# Patient Record
Sex: FEMALE | Race: WHITE | NOT HISPANIC OR LATINO | URBAN - METROPOLITAN AREA
[De-identification: names, ages, dates, MRNs, and addresses within clinical notes are randomized per-mention and may not be internally consistent; named-entity substitution may affect disease eponyms.]

---

## 2018-12-10 ENCOUNTER — OFFICE VISIT (OUTPATIENT)
Dept: OBSTETRICS AND GYNECOLOGY | Facility: CLINIC | Age: 58
End: 2018-12-10
Payer: COMMERCIAL

## 2018-12-10 VITALS
WEIGHT: 159 LBS | SYSTOLIC BLOOD PRESSURE: 100 MMHG | BODY MASS INDEX: 25.55 KG/M2 | DIASTOLIC BLOOD PRESSURE: 64 MMHG | HEIGHT: 66 IN

## 2018-12-10 DIAGNOSIS — Z01.419 ENCOUNTER FOR ANNUAL ROUTINE GYNECOLOGICAL EXAMINATION: ICD-10-CM

## 2018-12-10 DIAGNOSIS — Z12.39 SCREENING FOR MALIGNANT NEOPLASM OF BREAST: Primary | ICD-10-CM

## 2018-12-10 PROCEDURE — 87624 HPV HI-RISK TYP POOLED RSLT: CPT | Performed by: OBSTETRICS & GYNECOLOGY

## 2018-12-10 PROCEDURE — G0145 SCR C/V CYTO,THINLAYER,RESCR: HCPCS | Performed by: OBSTETRICS & GYNECOLOGY

## 2018-12-10 PROCEDURE — 99386 PREV VISIT NEW AGE 40-64: CPT | Performed by: OBSTETRICS & GYNECOLOGY

## 2018-12-10 ASSESSMENT — ENCOUNTER SYMPTOMS
APPETITE CHANGE: 0
DEPRESSION: 0
NAUSEA: 0
SLEEP DISTURBANCE: 0
CONSTIPATION: 1
FEVER: 0
SEIZURES: 0
COUGH: 0
CHEST TIGHTNESS: 0
CONFUSION: 0
ADENOPATHY: 0
EYE PAIN: 0
BRUISES/BLEEDS EASILY: 0
FATIGUE: 0
CHILLS: 0
MYALGIAS: 0
DIARRHEA: 0
DIZZINESS: 0
VOMITING: 0
ABDOMINAL PAIN: 0
NUMBNESS: 0
WEAKNESS: 0
BREAST PAIN: 0
TROUBLE SWALLOWING: 0
BREAST MASS: 0
DIFFICULTY URINATING: 0
SORE THROAT: 0
VOICE CHANGE: 0
BLOOD IN STOOL: 0
NERVOUS/ANXIOUS: 0
AGITATION: 0
SHORTNESS OF BREATH: 0
UNEXPECTED WEIGHT CHANGE: 0
PALPITATIONS: 0

## 2018-12-10 NOTE — PROGRESS NOTES
"12/10/2018  Elizabeth Pleitez is a 58 y.o.  female who presents for annual exam. Concerns today include none.  Has not seen physician in several years (decades).  Needs to reestablish care.   passed away 6 years ago due to \"internal bleeding.\"  Not currently sexually active.      The patient is not currently sexually active. Partners: male    Current contraception: none    Periods are postmenopausal 41yo.     Menopausal symptoms: present and include vaginal driness.    GYN screening history: last pap: was normal. In 40s  History of abnormal Pap smear: yes - s/p cone.  normal after  History of abnormal mammogram: no, but last in her mid 40s  Family history of uterine or ovarian cancer: no  Family history of breast cancer: no  Regular self breast exam: no    Menstrual History:  OB History      Para Term  AB Living    0 0 0 0 0 0    SAB TAB Ectopic Multiple Live Births    0 0 0 0 0         No LMP recorded. Patient is postmenopausal.  Menopause Status: Post-Menopause  Age at menopause: 38  Age at Menarche: 14      Review of Systems and Physical Exam  The following have been reviewed and updated as appropriate in this visit: Problems       /64 (BP Location: Left upper arm, Patient Position: Sitting)   Ht 1.664 m (5' 5.5\")   Wt 72.1 kg (159 lb)   BMI 26.06 kg/m²   Review of Systems   Constitutional: Negative for appetite change, chills, fatigue, fever and unexpected weight change.   HENT: Negative for congestion, hearing loss, mouth sores, sore throat, trouble swallowing and voice change.    Eyes: Negative for pain.   Respiratory: Negative for cough, chest tightness and shortness of breath.    Cardiovascular: Negative for chest pain and palpitations.   Gastrointestinal: Positive for constipation. Negative for abdominal pain, blood in stool, diarrhea, nausea and vomiting.   Endocrine: Negative for cold intolerance and heat intolerance.   Genitourinary: Positive for menopause. Negative for " difficulty urinating, dyspareunia, genital sores, pelvic pain, urgency, vaginal bleeding, vaginal discharge, vaginal itching and vaginal pain.   Breast: Negative for breast discharge, breast mass and breast pain.   Musculoskeletal: Negative for gait problem and myalgias.   Allergic/Immunologic: Negative for immunocompromised state.   Neurological: Negative for dizziness, seizures, weakness and numbness.   Hematological: Negative for adenopathy. Does not bruise/bleed easily.   Psychiatric/Behavioral: Negative for agitation, behavioral problems, confusion, depression and sleep disturbance. The patient is not nervous/anxious.      Physical Exam   Constitutional: She is oriented to person, place, and time. She appears well-developed and well-nourished.   Genitourinary: Uterus normal. Pelvic exam was performed with patient in supine position.   No labial rash.   External female genitalia normal. No signs of erythema or atrophy.   Urethral meatus normal. There is no urethral meatus prolapse, lesion, tenderness or bleeding. There is no urethral discharge, erythema, tenderness or mass. The urethra is not everted.   Normal bladder. No bladder tenderness. Vagina exhibits no lesion. There is no vaginal atrophy. No erythema, tenderness or bleeding in the vagina. No vaginal discharge found. Perineum intact.   Cervix exam normal.Cervix does not exhibit motion tenderness, discharge, friability or polyp.   Uterus is normal. Uterus is mobile. Uterus is not tender. Uterine contour is regular. Uterus is retroflexed. Right adnexum does not display mass, does not display tenderness and does not display fullness. Left adnexum does not display mass, does not display tenderness and does not display fullness.   Rectal exam shows normal sphincter tone, no rectal prolapse and no external hemorrhoid.   HENT:   Head: Normocephalic and atraumatic.   Eyes: EOM are normal. Pupils are equal, round, and reactive to light.   Neck: Normal range of  motion. Neck supple.   Cardiovascular: Normal rate and regular rhythm.    Pulmonary/Chest: Effort normal and breath sounds normal. Right breast exhibits no mass, no nipple discharge, no skin change and no tenderness. Left breast exhibits no mass, no nipple discharge, no skin change and no tenderness.   Abdominal: Soft. She exhibits no mass. There is no tenderness. There is no rebound and no guarding.   Neurological: She is alert and oriented to person, place, and time.   Skin: Skin is warm.   Psychiatric: She has a normal mood and affect. Her behavior is normal. Thought content normal.       Assessment/Plan:    Routine annual Gyn exam. Normal exam today. Pap with HPV done today. CBE normal, SBE taught. Mammogram ordered. Colonoscopy recommended.  Has PCP appt tomorrow.  Will schedule.  No problem-specific Assessment & Plan notes found for this encounter.     Diet, exercise, healthy lifestyle discussed. All questions answered.  Problem List Items Addressed This Visit     None      Visit Diagnoses     Screening for malignant neoplasm of breast    -  Primary    Relevant Orders    BI SCREENING MAMMOGRAM BILATERAL    Encounter for annual routine gynecological examination        Relevant Orders    Cytology, Thinprep Pap    THINPREP PAP ([facility])        New Medications Ordered This Visit   Medications   • ascorbate calcium (VITAMIN C ORAL)     Sig: Take by mouth.   • vitamin E acetate (VITAMIN E ORAL)     Sig: Take by mouth.   • MULTIVITAMIN ORAL     Sig: Take by mouth.     No Follow-up on file.  Karen Dickson, DO

## 2018-12-11 ENCOUNTER — OFFICE VISIT (OUTPATIENT)
Dept: PRIMARY CARE | Facility: CLINIC | Age: 58
End: 2018-12-11
Payer: COMMERCIAL

## 2018-12-11 VITALS
HEIGHT: 66 IN | OXYGEN SATURATION: 99 % | DIASTOLIC BLOOD PRESSURE: 70 MMHG | WEIGHT: 158.2 LBS | RESPIRATION RATE: 12 BRPM | HEART RATE: 83 BPM | BODY MASS INDEX: 25.43 KG/M2 | SYSTOLIC BLOOD PRESSURE: 120 MMHG

## 2018-12-11 DIAGNOSIS — Z00.00 ENCOUNTER FOR GENERAL ADULT MEDICAL EXAMINATION WITHOUT ABNORMAL FINDINGS: ICD-10-CM

## 2018-12-11 DIAGNOSIS — Z11.59 NEED FOR HEPATITIS C SCREENING TEST: ICD-10-CM

## 2018-12-11 DIAGNOSIS — Z12.11 SCREEN FOR COLON CANCER: Primary | ICD-10-CM

## 2018-12-11 DIAGNOSIS — C44.311 BASAL CELL CARCINOMA (BCC) OF SKIN OF NOSE: ICD-10-CM

## 2018-12-11 PROBLEM — C44.91 CARCINOMA, BASAL CELL, SKIN: Status: ACTIVE | Noted: 2018-12-11

## 2018-12-11 PROCEDURE — 99386 PREV VISIT NEW AGE 40-64: CPT | Performed by: INTERNAL MEDICINE

## 2018-12-11 RX ORDER — DIMETHICONE 13 MG/ML
LOTION TOPICAL DAILY
COMMUNITY

## 2018-12-11 RX ORDER — TETANUS TOXOID, REDUCED DIPHTHERIA TOXOID AND ACELLULAR PERTUSSIS VACCINE, ADSORBED 5; 2.5; 8; 8; 2.5 [IU]/.5ML; [IU]/.5ML; UG/.5ML; UG/.5ML; UG/.5ML
SUSPENSION INTRAMUSCULAR
Refills: 0 | COMMUNITY
Start: 2018-12-09 | End: 2019-10-05 | Stop reason: ALTCHOICE

## 2018-12-11 ASSESSMENT — ENCOUNTER SYMPTOMS
SHORTNESS OF BREATH: 0
NERVOUS/ANXIOUS: 0
COUGH: 0
MYALGIAS: 0
NUMBNESS: 0
CHILLS: 0
HEMATURIA: 0
CONSTIPATION: 0
EYE DISCHARGE: 0
FEVER: 0
WHEEZING: 0
CHEST TIGHTNESS: 0
PALPITATIONS: 0
ABDOMINAL PAIN: 0
FREQUENCY: 0
BLOOD IN STOOL: 0
LIGHT-HEADEDNESS: 0
VOMITING: 0
WEAKNESS: 0
DYSURIA: 0
ADENOPATHY: 0
ARTHRALGIAS: 0
SORE THROAT: 0
FATIGUE: 0
HEADACHES: 0
EYE PAIN: 0
SLEEP DISTURBANCE: 0
BRUISES/BLEEDS EASILY: 0
DIARRHEA: 0
SINUS PRESSURE: 0
RHINORRHEA: 0
ACTIVITY CHANGE: 0
DIZZINESS: 0

## 2018-12-11 NOTE — PATIENT INSTRUCTIONS
You are due for a colonoscopy and mammogram.    I recommend that you get the new shingles vaccine.      You should be fasting for 10 hours before having your labs drawn. Call about a week after the labs are done if you haven't heard from me with the results.
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
## 2018-12-20 LAB
CASE RPRT: NORMAL
CLINICAL INFO: NORMAL
CLINICAL INFO: NORMAL
HPV HR 12 DNA CVX QL NAA+PROBE: NEGATIVE
HPV16 DNA SPEC QL NAA+PROBE: NEGATIVE
HPV18 DNA SPEC QL NAA+PROBE: NEGATIVE
LMP START DATE: NORMAL
SPECIMEN PROCESSING COMMENT: NORMAL
THIN PREP CVX: NORMAL

## 2018-12-23 LAB
ALBUMIN SERPL-MCNC: 4.4 G/DL (ref 3.6–4.8)
ALBUMIN/GLOB SERPL: 1.9 {RATIO} (ref 1.2–2.2)
ALP SERPL-CCNC: 72 IU/L (ref 39–117)
ALT SERPL-CCNC: 17 IU/L (ref 0–32)
AST SERPL-CCNC: 18 IU/L (ref 0–40)
BASOPHILS # BLD AUTO: 0 X10E3/UL (ref 0–0.2)
BASOPHILS NFR BLD AUTO: 0 %
BILIRUB SERPL-MCNC: 0.3 MG/DL (ref 0–1.2)
BUN SERPL-MCNC: 13 MG/DL (ref 8–27)
BUN/CREAT SERPL: 16 (ref 12–28)
CALCIUM SERPL-MCNC: 10 MG/DL (ref 8.7–10.3)
CHLORIDE SERPL-SCNC: 102 MMOL/L (ref 96–106)
CHOLEST SERPL-MCNC: 203 MG/DL (ref 100–199)
CO2 SERPL-SCNC: 28 MMOL/L (ref 20–29)
CREAT SERPL-MCNC: 0.81 MG/DL (ref 0.57–1)
EOSINOPHIL # BLD AUTO: 0.1 X10E3/UL (ref 0–0.4)
EOSINOPHIL NFR BLD AUTO: 1 %
ERYTHROCYTE [DISTWIDTH] IN BLOOD BY AUTOMATED COUNT: 14.2 % (ref 12.3–15.4)
GLOBULIN SER CALC-MCNC: 2.3 G/DL (ref 1.5–4.5)
GLUCOSE SERPL-MCNC: 82 MG/DL (ref 65–99)
HCT VFR BLD AUTO: 41.8 % (ref 34–46.6)
HCV AB S/CO SERPL IA: <0.1 S/CO RATIO (ref 0–0.9)
HDLC SERPL-MCNC: 60 MG/DL
HGB BLD-MCNC: 13.6 G/DL (ref 11.1–15.9)
IMM GRANULOCYTES # BLD: 0 X10E3/UL (ref 0–0.1)
IMM GRANULOCYTES NFR BLD: 0 %
LAB CORP EGFR IF AFRICN AM: 91 ML/MIN/1.73
LAB CORP EGFR IF NONAFRICN AM: 79 ML/MIN/1.73
LDLC SERPL CALC-MCNC: 126 MG/DL (ref 0–99)
LYMPHOCYTES # BLD AUTO: 1.9 X10E3/UL (ref 0.7–3.1)
LYMPHOCYTES NFR BLD AUTO: 31 %
MCH RBC QN AUTO: 29.7 PG (ref 26.6–33)
MCHC RBC AUTO-ENTMCNC: 32.5 G/DL (ref 31.5–35.7)
MCV RBC AUTO: 91 FL (ref 79–97)
MONOCYTES # BLD AUTO: 0.5 X10E3/UL (ref 0.1–0.9)
MONOCYTES NFR BLD AUTO: 8 %
NEUTROPHILS # BLD AUTO: 3.7 X10E3/UL (ref 1.4–7)
NEUTROPHILS NFR BLD AUTO: 60 %
PLATELET # BLD AUTO: 263 X10E3/UL (ref 150–379)
POTASSIUM SERPL-SCNC: 4.5 MMOL/L (ref 3.5–5.2)
PROT SERPL-MCNC: 6.7 G/DL (ref 6–8.5)
RBC # BLD AUTO: 4.58 X10E6/UL (ref 3.77–5.28)
SODIUM SERPL-SCNC: 144 MMOL/L (ref 134–144)
TRIGL SERPL-MCNC: 85 MG/DL (ref 0–149)
TSH SERPL DL<=0.005 MIU/L-ACNC: 3.53 UIU/ML (ref 0.45–4.5)
VLDLC SERPL CALC-MCNC: 17 MG/DL (ref 5–40)
WBC # BLD AUTO: 6.2 X10E3/UL (ref 3.4–10.8)

## 2018-12-24 ENCOUNTER — TELEPHONE (OUTPATIENT)
Dept: PRIMARY CARE | Facility: CLINIC | Age: 58
End: 2018-12-24

## 2018-12-31 ENCOUNTER — DOCUMENTATION (OUTPATIENT)
Dept: PRIMARY CARE | Facility: CLINIC | Age: 58
End: 2018-12-31

## 2019-01-26 ENCOUNTER — HOSPITAL ENCOUNTER (OUTPATIENT)
Dept: RADIOLOGY | Facility: HOSPITAL | Age: 59
Discharge: HOME | End: 2019-01-26
Attending: OBSTETRICS & GYNECOLOGY
Payer: COMMERCIAL

## 2019-01-26 DIAGNOSIS — Z12.39 SCREENING FOR MALIGNANT NEOPLASM OF BREAST: ICD-10-CM

## 2019-01-26 PROCEDURE — 77063 BREAST TOMOSYNTHESIS BI: CPT

## 2019-08-23 ENCOUNTER — HOSPITAL ENCOUNTER (OUTPATIENT)
Facility: CLINIC | Age: 59
End: 2019-08-23
Payer: COMMERCIAL

## 2019-09-28 ENCOUNTER — HOSPITAL ENCOUNTER (EMERGENCY)
Facility: HOSPITAL | Age: 59
Discharge: HOME | End: 2019-09-28
Attending: EMERGENCY MEDICINE
Payer: COMMERCIAL

## 2019-09-28 ENCOUNTER — APPOINTMENT (EMERGENCY)
Dept: RADIOLOGY | Facility: HOSPITAL | Age: 59
End: 2019-09-28
Attending: EMERGENCY MEDICINE
Payer: COMMERCIAL

## 2019-09-28 ENCOUNTER — APPOINTMENT (EMERGENCY)
Dept: RADIOLOGY | Facility: HOSPITAL | Age: 59
End: 2019-09-28
Attending: SURGERY
Payer: COMMERCIAL

## 2019-09-28 VITALS
TEMPERATURE: 99.6 F | DIASTOLIC BLOOD PRESSURE: 56 MMHG | SYSTOLIC BLOOD PRESSURE: 116 MMHG | OXYGEN SATURATION: 99 % | HEART RATE: 64 BPM | RESPIRATION RATE: 16 BRPM

## 2019-09-28 DIAGNOSIS — S82.892A CLOSED FRACTURE OF LEFT ANKLE, INITIAL ENCOUNTER: Primary | ICD-10-CM

## 2019-09-28 LAB
ABO + RH BLD: NORMAL
ALBUMIN SERPL-MCNC: 3.8 G/DL (ref 3.4–5)
ALP SERPL-CCNC: 72 IU/L (ref 35–126)
ALT SERPL-CCNC: 20 IU/L
AMPHET UR QL SCN: NORMAL
ANION GAP SERPL CALC-SCNC: 8 MEQ/L (ref 3–15)
APTT PPP: 31 SEC (ref 23–35)
AST SERPL-CCNC: 22 IU/L (ref 15–41)
BARBITURATES UR QL SCN: NORMAL
BASOPHILS # BLD: 0.03 K/UL
BASOPHILS NFR BLD: 0.5 %
BENZODIAZ UR QL SCN: NORMAL
BILIRUB SERPL-MCNC: 0.5 MG/DL (ref 0.3–1.2)
BILIRUB UR QL STRIP.AUTO: NEGATIVE MG/DL
BLD GP AB SCN SERPL QL: NEGATIVE
BUN SERPL-MCNC: 13 MG/DL (ref 8–20)
CALCIUM SERPL-MCNC: 9.4 MG/DL (ref 8.9–10.3)
CANNABINOIDS UR QL SCN: NORMAL
CHLORIDE SERPL-SCNC: 103 MEQ/L (ref 98–109)
CLARITY UR REFRACT.AUTO: CLEAR
CO2 SERPL-SCNC: 28 MEQ/L (ref 22–32)
COCAINE UR QL SCN: NORMAL
COLOR UR AUTO: YELLOW
CREAT SERPL-MCNC: 0.9 MG/DL
D AG BLD QL: POSITIVE
DIFFERENTIAL METHOD BLD: NORMAL
EOSINOPHIL # BLD: 0.05 K/UL
EOSINOPHIL NFR BLD: 0.8 %
ERYTHROCYTE [DISTWIDTH] IN BLOOD BY AUTOMATED COUNT: 13.5 %
ETHANOL SERPL-MCNC: <5 MG/DL
GFR SERPL CREATININE-BSD FRML MDRD: ABNORMAL ML/MIN/1.73M*2
GLUCOSE SERPL-MCNC: 113 MG/DL (ref 70–99)
GLUCOSE UR STRIP.AUTO-MCNC: NEGATIVE MG/DL
HCT VFR BLDCO AUTO: 43 %
HGB BLD-MCNC: 13.6 G/DL
HGB UR QL STRIP.AUTO: NEGATIVE
IMM GRANULOCYTES # BLD AUTO: 0.02 K/UL
IMM GRANULOCYTES NFR BLD AUTO: 0.3 %
INR PPP: 1 INR
KETONES UR STRIP.AUTO-MCNC: NEGATIVE MG/DL
LABORATORY COMMENT REPORT: NORMAL
LACTATE SERPL-SCNC: 1.6 MMOL/L (ref 0.4–2)
LEUKOCYTE ESTERASE UR QL STRIP.AUTO: NEGATIVE
LYMPHOCYTES # BLD: 1.34 K/UL
LYMPHOCYTES NFR BLD: 22.7 %
MCH RBC QN AUTO: 29.9 PG
MCHC RBC AUTO-ENTMCNC: 31.6 G/DL
MCV RBC AUTO: 94.5 FL
MONOCYTES # BLD: 0.5 K/UL
MONOCYTES NFR BLD: 8.5 %
NEUTROPHILS # BLD: 3.97 K/UL
NEUTS SEG NFR BLD: 67.2 %
NITRITE UR QL STRIP.AUTO: NEGATIVE
NRBC BLD-RTO: 0 %
OPIATES UR QL SCN: NORMAL
PCP UR QL SCN: NORMAL
PDW BLD AUTO: 10.7 FL
PH UR STRIP.AUTO: 7.5 [PH]
PLATELET # BLD AUTO: 234 K/UL
POTASSIUM SERPL-SCNC: 4.2 MEQ/L (ref 3.6–5.1)
PROT SERPL-MCNC: 6.5 G/DL (ref 6–8.2)
PROT UR QL STRIP.AUTO: NEGATIVE
PROTHROMBIN TIME: 12.6 SEC (ref 12.2–14.5)
RBC # BLD AUTO: 4.55 M/UL
SODIUM SERPL-SCNC: 139 MEQ/L (ref 136–144)
SP GR UR REFRACT.AUTO: 1.01
UROBILINOGEN UR STRIP-ACNC: 0.2 EU/DL
WBC # BLD AUTO: 5.91 K/UL

## 2019-09-28 PROCEDURE — 2W3RX1Z IMMOBILIZATION OF LEFT LOWER LEG USING SPLINT: ICD-10-PCS | Performed by: ORTHOPAEDIC SURGERY

## 2019-09-28 PROCEDURE — 73600 X-RAY EXAM OF ANKLE: CPT | Mod: 59,LT

## 2019-09-28 PROCEDURE — 99283 EMERGENCY DEPT VISIT LOW MDM: CPT | Mod: 25

## 2019-09-28 PROCEDURE — 90715 TDAP VACCINE 7 YRS/> IM: CPT

## 2019-09-28 PROCEDURE — 85610 PROTHROMBIN TIME: CPT | Performed by: SURGERY

## 2019-09-28 PROCEDURE — G0480 DRUG TEST DEF 1-7 CLASSES: HCPCS | Performed by: SURGERY

## 2019-09-28 PROCEDURE — 73620 X-RAY EXAM OF FOOT: CPT | Mod: LT

## 2019-09-28 PROCEDURE — 36415 COLL VENOUS BLD VENIPUNCTURE: CPT | Performed by: SURGERY

## 2019-09-28 PROCEDURE — 63600000 HC DRUGS/DETAIL CODE

## 2019-09-28 PROCEDURE — 86900 BLOOD TYPING SEROLOGIC ABO: CPT

## 2019-09-28 PROCEDURE — 80307 DRUG TEST PRSMV CHEM ANLYZR: CPT | Performed by: SURGERY

## 2019-09-28 PROCEDURE — 80053 COMPREHEN METABOLIC PANEL: CPT | Performed by: SURGERY

## 2019-09-28 PROCEDURE — 90471 IMMUNIZATION ADMIN: CPT

## 2019-09-28 PROCEDURE — 3E0234Z INTRODUCTION OF SERUM, TOXOID AND VACCINE INTO MUSCLE, PERCUTANEOUS APPROACH: ICD-10-PCS | Performed by: SURGERY

## 2019-09-28 PROCEDURE — 81003 URINALYSIS AUTO W/O SCOPE: CPT | Performed by: SURGERY

## 2019-09-28 PROCEDURE — 73590 X-RAY EXAM OF LOWER LEG: CPT | Mod: LT

## 2019-09-28 PROCEDURE — 83605 ASSAY OF LACTIC ACID: CPT | Performed by: SURGERY

## 2019-09-28 PROCEDURE — 73610 X-RAY EXAM OF ANKLE: CPT | Mod: LT

## 2019-09-28 PROCEDURE — 85730 THROMBOPLASTIN TIME PARTIAL: CPT | Performed by: SURGERY

## 2019-09-28 PROCEDURE — 85025 COMPLETE CBC W/AUTO DIFF WBC: CPT | Performed by: SURGERY

## 2019-09-28 PROCEDURE — 29515 APPLICATION SHORT LEG SPLINT: CPT | Mod: LT

## 2019-09-28 RX ORDER — LIDOCAINE HYDROCHLORIDE 10 MG/ML
INJECTION, SOLUTION EPIDURAL; INFILTRATION; INTRACAUDAL; PERINEURAL
Status: DISCONTINUED
Start: 2019-09-28 | End: 2019-09-28 | Stop reason: HOSPADM

## 2019-09-28 RX ORDER — DIPHENHYDRAMINE HCL 25 MG
25 CAPSULE ORAL EVERY 6 HOURS PRN
COMMUNITY
End: 2023-03-20

## 2019-09-28 RX ORDER — VIT C/E/ZN/COPPR/LUTEIN/ZEAXAN 250MG-90MG
5000 CAPSULE ORAL DAILY
COMMUNITY

## 2019-09-28 RX ORDER — ACETAMINOPHEN AND CODEINE PHOSPHATE 300; 30 MG/1; MG/1
1-2 TABLET ORAL EVERY 6 HOURS PRN
Qty: 12 TABLET | Refills: 0 | Status: SHIPPED | OUTPATIENT
Start: 2019-09-28 | End: 2019-10-08

## 2019-09-28 RX ADMIN — TETANUS TOXOID, REDUCED DIPHTHERIA TOXOID AND ACELLULAR PERTUSSIS VACCINE, ADSORBED 0.5 ML: 5; 2.5; 8; 8; 2.5 SUSPENSION INTRAMUSCULAR at 12:40

## 2019-09-28 ASSESSMENT — ENCOUNTER SYMPTOMS
CHEST TIGHTNESS: 0
WEAKNESS: 0
PALPITATIONS: 0
HEADACHES: 0
SHORTNESS OF BREATH: 0
LOSS OF CONSCIOUSNESS: 0
BACK PAIN: 0
NUMBNESS: 1
NECK PAIN: 0
EYE PAIN: 0
FACIAL SWELLING: 0
AGITATION: 0
ABDOMINAL PAIN: 0

## 2019-09-28 NOTE — DISCHARGE INSTRUCTIONS
RETURN IMMEDIATELY FOR ANY NEW OR WORSE SYMPTOMS;    REVIEW ANY LABS AND FINAL IMAGING RESULTS (SOMETIMES THESE MAY NOT BE BACK UNTIL TOMORROW)  WITH YOUR DOCTORS AT YOUR NEXT APPOINTMENT.    ** NO WEIGHT BEARING UNTIL CLEARED BY THE ORTHOPEDIST
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
## 2019-09-28 NOTE — PROGRESS NOTES
Intern visited pt. And mother in room #ED01. Pt. Was walked out of room by nurse to bathroom. Spoke to mother who was stressed also because of her recent procedure. Educated mother on what  Department can offer and provided our leaflet. She thanked for visit. When I was to document, noticed that pt was d/c ed.     Beau Hawkins  Intern  x2020, Beeper #[id number]
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
## 2019-09-30 ENCOUNTER — TELEPHONE (OUTPATIENT)
Dept: PRIMARY CARE | Facility: CLINIC | Age: 59
End: 2019-09-30

## 2019-09-30 NOTE — TELEPHONE ENCOUNTER
She is a former [facility] patient. She has 2 charts which I just submitted a merge request. Patient needs to make an appt with orthopedics. Once it has been decided if she needs surgery she will call back to make an appt.

## 2019-10-01 ENCOUNTER — TELEPHONE (OUTPATIENT)
Dept: PRIMARY CARE | Facility: CLINIC | Age: 59
End: 2019-10-01

## 2019-10-01 NOTE — TELEPHONE ENCOUNTER
Pt notified she will need to make an appointment to [email or web address]] care with Dr. Choi

## 2019-10-05 ENCOUNTER — APPOINTMENT (EMERGENCY)
Dept: RADIOLOGY | Facility: HOSPITAL | Age: 59
End: 2019-10-05
Payer: COMMERCIAL

## 2019-10-05 ENCOUNTER — APPOINTMENT (EMERGENCY)
Dept: RADIOLOGY | Facility: HOSPITAL | Age: 59
End: 2019-10-05
Attending: EMERGENCY MEDICINE
Payer: COMMERCIAL

## 2019-10-05 ENCOUNTER — HOSPITAL ENCOUNTER (EMERGENCY)
Facility: HOSPITAL | Age: 59
Discharge: HOME | End: 2019-10-05
Attending: EMERGENCY MEDICINE
Payer: COMMERCIAL

## 2019-10-05 VITALS
DIASTOLIC BLOOD PRESSURE: 53 MMHG | SYSTOLIC BLOOD PRESSURE: 107 MMHG | WEIGHT: 150 LBS | HEIGHT: 65 IN | HEART RATE: 65 BPM | RESPIRATION RATE: 16 BRPM | TEMPERATURE: 98.6 F | BODY MASS INDEX: 24.99 KG/M2 | OXYGEN SATURATION: 99 %

## 2019-10-05 DIAGNOSIS — R07.89 CHEST WALL PAIN: Primary | ICD-10-CM

## 2019-10-05 LAB
ANION GAP SERPL CALC-SCNC: 7 MEQ/L (ref 3–15)
BASOPHILS # BLD: 0.04 K/UL (ref 0.01–0.1)
BASOPHILS NFR BLD: 0.6 %
BUN SERPL-MCNC: 10 MG/DL (ref 8–20)
CALCIUM SERPL-MCNC: 9.2 MG/DL (ref 8.9–10.3)
CHLORIDE SERPL-SCNC: 107 MEQ/L (ref 98–109)
CO2 SERPL-SCNC: 28 MEQ/L (ref 22–32)
CREAT SERPL-MCNC: 0.7 MG/DL
D DIMER PPP IA.FEU-MCNC: 2.84 UG/ML FEU (ref 0–0.5)
DIFFERENTIAL METHOD BLD: NORMAL
EOSINOPHIL # BLD: 0.1 K/UL (ref 0.04–0.36)
EOSINOPHIL NFR BLD: 1.6 %
ERYTHROCYTE [DISTWIDTH] IN BLOOD BY AUTOMATED COUNT: 13.2 % (ref 11.7–14.4)
GFR SERPL CREATININE-BSD FRML MDRD: >60 ML/MIN/1.73M*2
GLUCOSE SERPL-MCNC: 108 MG/DL (ref 70–99)
HCT VFR BLDCO AUTO: 38.3 %
HGB BLD-MCNC: 12.1 G/DL
IMM GRANULOCYTES # BLD AUTO: 0.04 K/UL (ref 0–0.08)
IMM GRANULOCYTES NFR BLD AUTO: 0.6 %
LYMPHOCYTES # BLD: 1.56 K/UL (ref 1.2–3.5)
LYMPHOCYTES NFR BLD: 25.1 %
MCH RBC QN AUTO: 29.7 PG (ref 28–33.2)
MCHC RBC AUTO-ENTMCNC: 31.6 G/DL (ref 32.2–35.5)
MCV RBC AUTO: 94.1 FL (ref 83–98)
MONOCYTES # BLD: 0.52 K/UL (ref 0.28–0.8)
MONOCYTES NFR BLD: 8.4 %
NEUTROPHILS # BLD: 3.96 K/UL (ref 1.7–7)
NEUTS SEG NFR BLD: 63.7 %
NRBC BLD-RTO: 0 %
PDW BLD AUTO: 10.1 FL (ref 9.4–12.3)
PLATELET # BLD AUTO: 253 K/UL
POTASSIUM SERPL-SCNC: 3.9 MEQ/L (ref 3.6–5.1)
RBC # BLD AUTO: 4.07 M/UL (ref 3.93–5.22)
SODIUM SERPL-SCNC: 142 MEQ/L (ref 136–144)
TROPONIN I SERPL-MCNC: <0.03 NG/ML
WBC # BLD AUTO: 6.22 K/UL

## 2019-10-05 PROCEDURE — 85379 FIBRIN DEGRADATION QUANT: CPT | Performed by: EMERGENCY MEDICINE

## 2019-10-05 PROCEDURE — 71260 CT THORAX DX C+: CPT

## 2019-10-05 PROCEDURE — 85025 COMPLETE CBC W/AUTO DIFF WBC: CPT | Performed by: EMERGENCY MEDICINE

## 2019-10-05 PROCEDURE — 80048 BASIC METABOLIC PNL TOTAL CA: CPT | Performed by: EMERGENCY MEDICINE

## 2019-10-05 PROCEDURE — 71046 X-RAY EXAM CHEST 2 VIEWS: CPT

## 2019-10-05 PROCEDURE — 36415 COLL VENOUS BLD VENIPUNCTURE: CPT | Performed by: EMERGENCY MEDICINE

## 2019-10-05 PROCEDURE — 93005 ELECTROCARDIOGRAM TRACING: CPT

## 2019-10-05 PROCEDURE — 84484 ASSAY OF TROPONIN QUANT: CPT | Performed by: EMERGENCY MEDICINE

## 2019-10-05 PROCEDURE — 93005 ELECTROCARDIOGRAM TRACING: CPT | Performed by: EMERGENCY MEDICINE

## 2019-10-05 PROCEDURE — 99284 EMERGENCY DEPT VISIT MOD MDM: CPT | Mod: 25

## 2019-10-05 PROCEDURE — 63600105 HC IODINE BASED CONTRAST: Performed by: EMERGENCY MEDICINE

## 2019-10-05 RX ADMIN — IOHEXOL 80 ML: 350 INJECTION, SOLUTION INTRAVENOUS at 05:57

## 2019-10-05 ASSESSMENT — ENCOUNTER SYMPTOMS
DIFFICULTY URINATING: 0
SHORTNESS OF BREATH: 1
COUGH: 0
VOMITING: 0
FEVER: 0
ABDOMINAL PAIN: 0
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
## 2019-10-06 LAB
ATRIAL RATE: 71
P AXIS: 81
PR INTERVAL: 138
QRS DURATION: 86
QT INTERVAL: 414
QTC CALCULATION(BAZETT): 449
R AXIS: 17
T WAVE AXIS: 56
VENTRICULAR RATE: 71

## 2019-10-06 PROCEDURE — 93010 ELECTROCARDIOGRAM REPORT: CPT | Performed by: INTERNAL MEDICINE

## 2019-10-07 ENCOUNTER — TELEPHONE (OUTPATIENT)
Dept: PRIMARY CARE | Facility: CLINIC | Age: 59
End: 2019-10-07

## 2019-10-07 NOTE — TELEPHONE ENCOUNTER
Spoke to patient she follow up with ortho. Once that is completed she will call office and set up an appt to establish care with a new PCP

## 2019-10-31 ENCOUNTER — TRANSCRIBE ORDERS (OUTPATIENT)
Dept: SCHEDULING | Age: 59
End: 2019-10-31

## 2019-10-31 DIAGNOSIS — S82.842D DISPLACED BIMALLEOLAR FRACTURE OF LEFT LOWER LEG, SUBSEQUENT ENCOUNTER FOR CLOSED FRACTURE WITH ROUTINE HEALING: Primary | ICD-10-CM

## 2019-11-04 ENCOUNTER — OFFICE VISIT (OUTPATIENT)
Dept: WOUND CARE | Facility: HOSPITAL | Age: 59
End: 2019-11-04
Attending: SURGERY
Payer: COMMERCIAL

## 2019-11-04 VITALS — HEART RATE: 80 BPM | RESPIRATION RATE: 18 BRPM | TEMPERATURE: 97.5 F

## 2019-11-04 DIAGNOSIS — S91.002A OPEN WOUND OF LEFT ANKLE, INITIAL ENCOUNTER: Primary | ICD-10-CM

## 2019-11-04 DIAGNOSIS — R60.0 EDEMA OF LOWER EXTREMITY: ICD-10-CM

## 2019-11-04 DIAGNOSIS — L90.9 ATROPHIC CONDITION OF SKIN: ICD-10-CM

## 2019-11-04 PROCEDURE — 99204 OFFICE O/P NEW MOD 45 MIN: CPT | Performed by: SURGERY

## 2019-11-04 RX ORDER — SILVER SULFADIAZINE 10 G/1000G
CREAM TOPICAL DAILY
Qty: 85 G | Refills: 3 | Status: SHIPPED | OUTPATIENT
Start: 2019-11-04 | End: 2021-12-09

## 2019-11-04 RX ORDER — ASPIRIN 325 MG
325 TABLET, DELAYED RELEASE (ENTERIC COATED) ORAL DAILY
COMMUNITY
End: 2021-12-09

## 2019-11-04 ASSESSMENT — ENCOUNTER SYMPTOMS
APPETITE CHANGE: 0
WEAKNESS: 0
POLYDIPSIA: 0
NUMBNESS: 0
FREQUENCY: 0
HEMATURIA: 0
COUGH: 0
SHORTNESS OF BREATH: 0
COLOR CHANGE: 0
JOINT SWELLING: 0
ABDOMINAL DISTENTION: 0
RHINORRHEA: 0
FEVER: 0
FATIGUE: 0
FLANK PAIN: 0
WOUND: 1
CONFUSION: 0
EYE DISCHARGE: 0
EYE REDNESS: 0
NERVOUS/ANXIOUS: 0
BRUISES/BLEEDS EASILY: 0

## 2019-11-04 NOTE — PATIENT INSTRUCTIONS
[facility] Instructions    MEDICATIONS     Medication Note  Continue present medications as prescribed by the [facility] or other physicians you see. To avoid any problems keep the [facility] informed each visit of any medications changes that occur.     WOUND CARE     Clean Wound with: Soap and Water and Showering   Treatment 1   Location: Left leg wounds,medial/lateral  Dressing: Apply silvadene cream to a gauze pad then apply to areas,wrap with rosie and secure with tape. Then apply a tubigrip stocking. Apply a moisturizer to all intact skin. May apply polymen foam over dressings for padding if needed, purchase polymen foam on [employer]   Dressing Care Frequency: Daily  Care Provider: Self     COMPRESSION THERAPY     Tubigrip Stockings  Left Leg size D  • Apply Tubigrips (cotton/compression stockinette) DAILY.  • Apply stockings upon arising in the morning to obtain the best results.  • Remove stockings at bedtime once your legs are elevated.  • Do not allow the stockinette to roll down as it can reduce or interrupt the blood flow in your limb.  • Always wear the appropriate size that is recommended for you at the [facility].  • Tubigrips can be washed by hand with soap and water and hang to dry overnight.         Basic Principles      • Wash your hands thoroughly with soap and water and after each dressing change. If someone other than the patient changes the dressing, it’s best to wear disposable gloves.   • Do not get the wound or dressing wet.   • To shower: remove the dressing, shower with soap and water (including washing the wound - do not use a washcloth), air dry, then redress the wound.  • Do not take a tub bath  • Keep all your dressings in a clean, covered container at home to avoid dust and contamination.  • Discard used dressings in a plastic bag or covered trash container.  • Check your wound and the surrounding skin at each dressing change for redness, warmth, swelling,  increased pain, foul odor, fever, pus or abnormal drainage or discharge.  • Notify [facility]aling Center if any of these changes occur - Dept: [phone].        Nutrition  • Eat a well, balanced diet with adequate protein to support wound healing.   • Take a multivitamin every day. Adequate nutrition supports healing and new tissue growth.  • All diabetic patients should strive to keep blood sugars within a normal, practical range.   • Elevated blood sugars can delay your wound healing.        ACTIVITY     Elevate legs above level of heart   Elevate your legs above the level of your heart for specific time periods during the day on several firm pillows or a foam leg wedge  Use of a recliner chair at home can often help in the appropriate elevation of your legs above the level of your heart  Normal activity then rest and elevation  May shower    MOBILITY     per ortho
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
## 2019-11-04 NOTE — ASSESSMENT & PLAN NOTE
PLAN:  Tubigrip D is preferred method of compression for lower extremities  Maintain leg elevation above level of the heart as much as possible.  Restrict fluid intake to < 66oz/24 hours  Restrict salt intake to no more than 2117-0052 mgs/sodium/day  Minimize prolonged sitting and standing  Remain active to point of physical tolerance

## 2019-11-04 NOTE — ASSESSMENT & PLAN NOTE
Rx: Silvadene 1% cream, 85 g, applied to wounds daily, 1 refill    Plan:  Apply Silvadene cream and gauze dressings to all sites of skin eschar and cover with gauze dressings.  Patient can purchase PolyMem foam on line as extra padding over bony prominences.  I would then recommend application of a Tubigrip stocking.  The patient would like to try to avoid using circumferential Betty dressing.  Dressing should be changed daily.  Sites should be washed thoroughly on a daily basis using warm water and soap.
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
## 2019-11-04 NOTE — ASSESSMENT & PLAN NOTE
PLAN:  Eucerin Original Healing Cream to dry skin of legs daily.     It is recommended that you clean the wound using running warm water, a mild bath soap, and a soft wash cloth or bath sponge. Proper cleansing of your wound is essential for wound healing to take place. Inadequate cleansing leads to the build up of yellow or green material in the wound bed, providing overgrowth of bacteria and noxious bacterial by products.

## 2019-11-07 ENCOUNTER — HOSPITAL ENCOUNTER (OUTPATIENT)
Dept: RADIOLOGY | Facility: HOSPITAL | Age: 59
Discharge: HOME | End: 2019-11-07
Attending: ORTHOPAEDIC SURGERY
Payer: COMMERCIAL

## 2019-11-07 DIAGNOSIS — S82.842D DISPLACED BIMALLEOLAR FRACTURE OF LEFT LOWER LEG, SUBSEQUENT ENCOUNTER FOR CLOSED FRACTURE WITH ROUTINE HEALING: ICD-10-CM

## 2019-11-07 PROCEDURE — 77080 DXA BONE DENSITY AXIAL: CPT

## 2019-11-11 ENCOUNTER — OFFICE VISIT (OUTPATIENT)
Dept: WOUND CARE | Facility: HOSPITAL | Age: 59
End: 2019-11-11
Attending: SURGERY
Payer: COMMERCIAL

## 2019-11-11 VITALS — RESPIRATION RATE: 16 BRPM | TEMPERATURE: 96.5 F | HEART RATE: 76 BPM

## 2019-11-11 DIAGNOSIS — L90.9 ATROPHIC CONDITION OF SKIN: ICD-10-CM

## 2019-11-11 DIAGNOSIS — S91.002D OPEN WOUND OF LEFT ANKLE, SUBSEQUENT ENCOUNTER: Primary | ICD-10-CM

## 2019-11-11 DIAGNOSIS — R60.0 EDEMA OF LOWER EXTREMITY: ICD-10-CM

## 2019-11-11 PROCEDURE — 27200101 HC ALLEVYM 4X4

## 2019-11-11 PROCEDURE — 99213 OFFICE O/P EST LOW 20 MIN: CPT | Performed by: SURGERY

## 2019-11-11 ASSESSMENT — ENCOUNTER SYMPTOMS
EYE DISCHARGE: 0
RHINORRHEA: 0
JOINT SWELLING: 0
POLYDIPSIA: 0
FREQUENCY: 0
APPETITE CHANGE: 0
HEMATURIA: 0
COLOR CHANGE: 0
BRUISES/BLEEDS EASILY: 0
FATIGUE: 0
FEVER: 0
CONFUSION: 0
NUMBNESS: 0
WOUND: 1
NERVOUS/ANXIOUS: 0
SHORTNESS OF BREATH: 0
FLANK PAIN: 0
WEAKNESS: 0
COUGH: 0
ABDOMINAL DISTENTION: 0
EYE REDNESS: 0

## 2019-11-11 NOTE — PATIENT INSTRUCTIONS
Wound Healing Center Instructions    MEDICATIONS     Medication Note  Continue present medications as prescribed by the [facility]ound Healing Center or other physicians you see. To avoid any problems keep the Wound Healing Center informed each visit of any medications changes that occur.     WOUND CARE     Clean Wound with: Soap and Water and Showering   Treatment 1   Location: Left leg wounds,medial/lateral  Dressing: Apply silvadene cream to a gauze pad then apply to areas,wrap with rosie and secure with tape. Then apply a tubigrip stocking. Apply a moisturizer to all intact skin. May apply polymen foam over dressings for padding if needed, purchase polymen foam on amaz[employer]   Dressing Care Frequency: Daily  Care Provider: Self     COMPRESSION THERAPY     Tubigrip Stockings  Left Leg size D  • Apply Tubigrips (cotton/compression stockinette) DAILY.  • Apply stockings upon arising in the morning to obtain the best results.  • Remove stockings at bedtime once your legs are elevated.  • Do not allow the stockinette to roll down as it can reduce or interrupt the blood flow in your limb.  • Always wear the appropriate size that is recommended for you at the [facility].  • Tubigrips can be washed by hand with soap and water and hang to dry overnight.         Basic Principles      • Wash your hands thoroughly with soap and water and after each dressing change. If someone other than the patient changes the dressing, it’s best to wear disposable gloves.   • Do not get the wound or dressing wet.   • To shower: remove the dressing, shower with soap and water (including washing the wound - do not use a washcloth), air dry, then redress the wound.  • Do not take a tub bath  • Keep all your dressings in a clean, covered container at home to avoid dust and contamination.  • Discard used dressings in a plastic bag or covered trash container.  • Check your wound and the surrounding skin at each dressing change for redness, warmth, swelling,  increased pain, foul odor, fever, pus or abnormal drainage or discharge.  • Notify Wound Healing Center if any of these changes occur - Dept: [phone].        Nutrition  • Eat a well, balanced diet with adequate protein to support wound healing.   • Take a multivitamin every day. Adequate nutrition supports healing and new tissue growth.  • All diabetic patients should strive to keep blood sugars within a normal, practical range.   • Elevated blood sugars can delay your wound healing.        ACTIVITY     Elevate legs above level of heart   Elevate your legs above the level of your heart for specific time periods during the day on several firm pillows or a foam leg wedge  Use of a recliner chair at home can often help in the appropriate elevation of your legs above the level of your heart  Normal activity then rest and elevation  May shower    MOBILITY     per ortho
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
## 2019-11-11 NOTE — ASSESSMENT & PLAN NOTE
Plan:  Continue applying Silvadene cream and gauze dressings to all wound sites.  Apply PolyMem foam as extra padding over bony prominences.  Compress with Tubigrip stocking.  Change dressings daily.  Site should be washed thoroughly on a daily basis using warm water and soap.

## 2019-11-20 ENCOUNTER — OFFICE VISIT (OUTPATIENT)
Dept: WOUND CARE | Facility: HOSPITAL | Age: 59
End: 2019-11-20
Attending: SURGERY
Payer: COMMERCIAL

## 2019-11-20 VITALS — TEMPERATURE: 97.8 F | RESPIRATION RATE: 20 BRPM | HEART RATE: 68 BPM

## 2019-11-20 DIAGNOSIS — R60.0 EDEMA OF LOWER EXTREMITY: ICD-10-CM

## 2019-11-20 DIAGNOSIS — L90.9 ATROPHIC CONDITION OF SKIN: ICD-10-CM

## 2019-11-20 DIAGNOSIS — S91.002D OPEN WOUND OF LEFT ANKLE, SUBSEQUENT ENCOUNTER: Primary | ICD-10-CM

## 2019-11-20 PROCEDURE — 99213 OFFICE O/P EST LOW 20 MIN: CPT | Performed by: SURGERY

## 2019-11-20 PROCEDURE — 27200101 HC ALLEVYM 4X4

## 2019-11-20 ASSESSMENT — ENCOUNTER SYMPTOMS
BRUISES/BLEEDS EASILY: 0
POLYDIPSIA: 0
COUGH: 0
FEVER: 0
NUMBNESS: 0
WOUND: 1
ABDOMINAL DISTENTION: 0
FREQUENCY: 0
CONFUSION: 0
JOINT SWELLING: 0
FATIGUE: 0
WEAKNESS: 0
COLOR CHANGE: 0
RHINORRHEA: 0
APPETITE CHANGE: 0
EYE DISCHARGE: 0
FLANK PAIN: 0
HEMATURIA: 0
SHORTNESS OF BREATH: 0
NERVOUS/ANXIOUS: 0
EYE REDNESS: 0

## 2019-11-20 ASSESSMENT — PAIN SCALES - GENERAL: PAINLEVEL: 2

## 2019-11-20 NOTE — PROGRESS NOTES
Patient ID: Elizabeth Pleitez                              : 1960  MRN: [id number]                                            Visit Date: 2019  Encounter Provider: BECCA Simms  Referring Provider: No ref. provider found    Subjective      HPI  Elizabeth Pleitez is a 59 y.o. old female with a chief compliant of Chronic Non-healing Wound (Ulceration left medial calf)   presenting today for evaluation and management of a traumatic wound of her left ankle.  Patient still experiencing a fair amount of chafing and pain while trying to ambulate in her MOFO boot.  She saw Dr. Berman last week.  Patient is now partial weightbearing.  Still using crutches.  Placed on vitamin D.      The following have been reviewed and updated as appropriate in this visit:  Tobacco  Allergies  Meds  Problems  Med Hx  Surg Hx  Fam Hx  Soc Hx        Past Medical History:  has a past medical history of Abnormal Pap smear of cervix and Clicking tinnitus of right ear.  Past Surgical History:  has a past surgical history that includes Cervical biopsy w/ loop electrode excision; Colposcopy; and Cervical biopsy w/ loop electrode excision.  Social History:  reports that she has quit smoking. She has never used smokeless tobacco. She reports that she does not drink alcohol or use drugs.  Family History: family history includes Diabetes in her biological father; Heart disease in her biological mother; Lung cancer in her biological father; Osteoporosis in her biological mother.  Medications:   Current Outpatient Medications:   •  ascorbate calcium (VITAMIN C ORAL), Take by mouth., Disp: , Rfl:   •  aspirin 325 mg EC tablet, Take 325 mg by mouth daily., Disp: , Rfl:   •  cholecalciferol, vitamin D3, 5,000 unit (125 mcg) capsule, Take 5,000 Units by mouth daily., Disp: , Rfl:   •  cranberry extract 425 mg capsule, Take by mouth daily., Disp: , Rfl:   •  diphenhydrAMINE (BENADRYL) 25 mg capsule, Take 25 mg by mouth every 6 (six)  hours as needed for itching., Disp: , Rfl:   •  MULTIVITAMIN ORAL, Take by mouth., Disp: , Rfl:   •  silver sulfadiazine (SILVADENE) 1 % cream, Apply topically daily., Disp: 85 g, Rfl: 3  •  vitamin E acetate (VITAMIN E ORAL), Take by mouth., Disp: , Rfl:     Allergies: is allergic to penicillins and penicillins.     Review of Systems   Constitutional: Negative for appetite change, fatigue and fever.   HENT: Negative for congestion and rhinorrhea.    Eyes: Negative for discharge and redness.   Respiratory: Negative for cough and shortness of breath.    Cardiovascular: Positive for leg swelling.   Gastrointestinal: Negative for abdominal distention.   Endocrine: Negative for cold intolerance, heat intolerance and polydipsia.   Genitourinary: Negative for flank pain, frequency and hematuria.   Musculoskeletal: Positive for gait problem. Negative for joint swelling.   Skin: Positive for wound. Negative for color change.   Allergic/Immunologic: Negative for immunocompromised state.   Neurological: Negative for weakness and numbness.   Hematological: Does not bruise/bleed easily.   Psychiatric/Behavioral: Negative for confusion. The patient is not nervous/anxious.      Objective   There were no vitals taken for this visit.    Physical Exam   Constitutional: She is oriented to person, place, and time. She appears well-developed. She is cooperative. No distress.   HENT:   Head: Normocephalic and atraumatic.   Eyes: Pupils are equal, round, and reactive to light. Conjunctivae are normal.   Neck: Trachea normal and normal range of motion. No edema and no erythema present.   Cardiovascular: Regular rhythm and intact distal pulses.   Pulmonary/Chest: Effort normal. No respiratory distress. She has no wheezes.   Abdominal: Soft. Normal appearance. There is no guarding.   Musculoskeletal: She exhibits edema. She exhibits no deformity.   Neurological: She is alert and oriented to person, place, and time. She exhibits abnormal  muscle tone.   Skin:        Psychiatric: Her speech is normal and behavior is normal. Her affect is not inappropriate. Cognition and memory are normal. She is attentive.   Nursing note and vitals reviewed.                  Left medial calf                                                                left lateral calf        Left lower extremity            Assessment/Plan    Diagnosis Plan   1. Open wound of left ankle, subsequent encounter     2. Edema of lower extremity     3. Atrophic condition of skin       Problem List Items Addressed This Visit        Musculoskeletal    Edema of lower extremity     PLAN:  Tubigrip D is preferred method of compression for lower extremities  Maintain leg elevation above level of the heart as much as possible.  Restrict fluid intake to < 66oz/24 hours  Restrict salt intake to no more than 4373-6362 mgs/sodium/day  Minimize prolonged sitting and standing  Remain active to point of physical tolerance         Open wound of left ankle - Primary     Plan:  Continue applying Silvadene cream and gauze dressings to all wound sites.  Apply PolyMem foam as extra padding over bony prominences.  Compress with Tubigrip stocking.  Change dressings daily.  Site should be washed thoroughly on a daily basis using warm water and soap            Other    Atrophic condition of skin     PLAN:  Eucerin Original Healing Cream to dry skin of legs daily.     It is recommended that you clean the wound using running warm water, a mild bath soap, and a soft wash cloth or bath sponge. Proper cleansing of your wound is essential for wound healing to take place. Inadequate cleansing leads to the build up of yellow or green material in the wound bed, providing overgrowth of bacteria and noxious bacterial by products.               Return to wound center in 2 week     BECCA Simms MD

## 2019-11-20 NOTE — ASSESSMENT & PLAN NOTE
PLAN:  Tubigrip D is preferred method of compression for lower extremities  Maintain leg elevation above level of the heart as much as possible.  Restrict fluid intake to < 66oz/24 hours  Restrict salt intake to no more than 0543-1273 mgs/sodium/day  Minimize prolonged sitting and standing  Remain active to point of physical tolerance

## 2019-11-20 NOTE — PATIENT INSTRUCTIONS
Wound Healing Center Instructions    MEDICATIONS     Medication Note  Continue present medications as prescribed by the Wound Healing Center or other physicians you see. To avoid any problems keep the Wound Healing Center informed each visit of any medications changes that occur.     WOUND CARE     Clean Wound with: Soap and Water and Showering     Treatment 1   Location: Left leg wound ,medial    Dressing: Apply silvadene cream to a gauze pad then apply to areas,wrap with rosie and secure with tape.                 Then apply a tubigrip stocking.                  Apply a moisturizer to all intact skin.                 May apply polymen foam over dressings for padding if needed, purchase polymen foam on [employer]     Dressing Care Frequency: Daily     Care Provider: Self     COMPRESSION THERAPY     Tubigrip Stockings  Left Leg size D  • Apply Tubigrips (cotton/compression stockinette) DAILY.  • Apply stockings upon arising in the morning to obtain the best results.  • Remove stockings at bedtime once your legs are elevated.  • Do not allow the stockinette to roll down as it can reduce or interrupt the blood flow in your limb.  • Always wear the appropriate size that is recommended for you at the [facility].  • Tubigrips can be washed by hand with soap and water and hang to dry overnight.         Basic Principles      • Wash your hands thoroughly with soap and water and after each dressing change. If someone other than the patient changes the dressing, it’s best to wear disposable gloves.   • Do not get the wound or dressing wet.   • To shower: remove the dressing, shower with soap and water (including washing the wound - do not use a washcloth), air dry, then redress the wound.  • Do not take a tub bath  • Keep all your dressings in a clean, covered container at home to avoid dust and contamination.  • Discard used dressings in a plastic bag or covered trash container.  • Check your wound and the surrounding skin at each  dressing change for redness, warmth, swelling, increased pain, foul odor, fever, pus or abnormal drainage or discharge.  • Notify Wound Healing Center if any of these changes occur - Dept: [phone].        Nutrition  • Eat a well, balanced diet with adequate protein to support wound healing.   • Take a multivitamin every day. Adequate nutrition supports healing and new tissue growth.  • All diabetic patients should strive to keep blood sugars within a normal, practical range.   • Elevated blood sugars can delay your wound healing.        ACTIVITY     Elevate legs above level of heart   Elevate your legs above the level of your heart for specific time periods during the day on several firm pillows or a foam leg wedge  Use of a recliner chair at home can often help in the appropriate elevation of your legs above the level of your heart  Normal activity then rest and elevation  May shower    MOBILITY     per ortho

## 2019-11-20 NOTE — LETTER
2019     Harika Choi MD  [address] [facility]  [address]  [place] [address]    Patient: Elizabeth Pleitez  YOB: 1960  Date of Visit: 2019      Dear Dr. Choi:    Thank you for referring Elizabeth Pleitez to me for evaluation. Below are my notes for this consultation.    If you have questions, please do not hesitate to call me. I look forward to following your patient along with you.         Sincerely,        BECCA Simms MD        CC: DO Mendez Hubbard W Randall, MD  2019  8:36 AM  Signed  Patient ID: Elizabeth Pleitez                              : 1960  MRN: [id number]                                            Visit Date: 2019  Encounter Provider: BECCA Simms  Referring Provider: No ref. provider found    Subjective      HPI  Elizabeth Pleitez is a 59 y.o. old female with a chief compliant of Chronic Non-healing Wound (Ulceration left medial calf)   presenting today for evaluation and management of a traumatic wound of her left ankle.  Patient still experiencing a fair amount of chafing and pain while trying to ambulate in her MOFO boot.  She saw Dr. Berman last week.  Patient is now partial weightbearing.  Still using crutches.  Placed on vitamin D.      The following have been reviewed and updated as appropriate in this visit:  Tobacco  Allergies  Meds  Problems  Med Hx  Surg Hx  Fam Hx  Soc Hx        Past Medical History:  has a past medical history of Abnormal Pap smear of cervix and Clicking tinnitus of right ear.  Past Surgical History:  has a past surgical history that includes Cervical biopsy w/ loop electrode excision; Colposcopy; and Cervical biopsy w/ loop electrode excision.  Social History:  reports that she has quit smoking. She has never used smokeless tobacco. She reports that she does not drink alcohol or use drugs.  Family History: family history includes Diabetes in her biological father; Heart disease in her  biological mother; Lung cancer in her biological father; Osteoporosis in her biological mother.  Medications:   Current Outpatient Medications:   •  ascorbate calcium (VITAMIN C ORAL), Take by mouth., Disp: , Rfl:   •  aspirin 325 mg EC tablet, Take 325 mg by mouth daily., Disp: , Rfl:   •  cholecalciferol, vitamin D3, 5,000 unit (125 mcg) capsule, Take 5,000 Units by mouth daily., Disp: , Rfl:   •  cranberry extract 425 mg capsule, Take by mouth daily., Disp: , Rfl:   •  diphenhydrAMINE (BENADRYL) 25 mg capsule, Take 25 mg by mouth every 6 (six) hours as needed for itching., Disp: , Rfl:   •  MULTIVITAMIN ORAL, Take by mouth., Disp: , Rfl:   •  silver sulfadiazine (SILVADENE) 1 % cream, Apply topically daily., Disp: 85 g, Rfl: 3  •  vitamin E acetate (VITAMIN E ORAL), Take by mouth., Disp: , Rfl:     Allergies: is allergic to penicillins and penicillins.     Review of Systems   Constitutional: Negative for appetite change, fatigue and fever.   HENT: Negative for congestion and rhinorrhea.    Eyes: Negative for discharge and redness.   Respiratory: Negative for cough and shortness of breath.    Cardiovascular: Positive for leg swelling.   Gastrointestinal: Negative for abdominal distention.   Endocrine: Negative for cold intolerance, heat intolerance and polydipsia.   Genitourinary: Negative for flank pain, frequency and hematuria.   Musculoskeletal: Positive for gait problem. Negative for joint swelling.   Skin: Positive for wound. Negative for color change.   Allergic/Immunologic: Negative for immunocompromised state.   Neurological: Negative for weakness and numbness.   Hematological: Does not bruise/bleed easily.   Psychiatric/Behavioral: Negative for confusion. The patient is not nervous/anxious.      Objective   There were no vitals taken for this visit.    Physical Exam   Constitutional: She is oriented to person, place, and time. She appears well-developed. She is cooperative. No distress.   HENT:   Head:  Normocephalic and atraumatic.   Eyes: Pupils are equal, round, and reactive to light. Conjunctivae are normal.   Neck: Trachea normal and normal range of motion. No edema and no erythema present.   Cardiovascular: Regular rhythm and intact distal pulses.   Pulmonary/Chest: Effort normal. No respiratory distress. She has no wheezes.   Abdominal: Soft. Normal appearance. There is no guarding.   Musculoskeletal: She exhibits edema. She exhibits no deformity.   Neurological: She is alert and oriented to person, place, and time. She exhibits abnormal muscle tone.   Skin:        Psychiatric: Her speech is normal and behavior is normal. Her affect is not inappropriate. Cognition and memory are normal. She is attentive.   Nursing note and vitals reviewed.                  Left medial calf                                                                left lateral calf        Left lower extremity            Assessment/Plan    Diagnosis Plan   1. Open wound of left ankle, subsequent encounter     2. Edema of lower extremity     3. Atrophic condition of skin       Problem List Items Addressed This Visit        Musculoskeletal    Edema of lower extremity     PLAN:  Tubigrip D is preferred method of compression for lower extremities  Maintain leg elevation above level of the heart as much as possible.  Restrict fluid intake to < 66oz/24 hours  Restrict salt intake to no more than 3676-4622 mgs/sodium/day  Minimize prolonged sitting and standing  Remain active to point of physical tolerance         Open wound of left ankle - Primary     Plan:  Continue applying Silvadene cream and gauze dressings to all wound sites.  Apply PolyMem foam as extra padding over bony prominences.  Compress with Tubigrip stocking.  Change dressings daily.  Site should be washed thoroughly on a daily basis using warm water and soap            Other    Atrophic condition of skin     PLAN:  Eucerin Original Healing Cream to dry skin of legs daily.     It  is recommended that you clean the wound using running warm water, a mild bath soap, and a soft wash cloth or bath sponge. Proper cleansing of your wound is essential for wound healing to take place. Inadequate cleansing leads to the build up of yellow or green material in the wound bed, providing overgrowth of bacteria and noxious bacterial by products.               Return to wound center in 2 week     BECCA Simms MD

## 2019-11-20 NOTE — ASSESSMENT & PLAN NOTE
Plan:  Continue applying Silvadene cream and gauze dressings to all wound sites.  Apply PolyMem foam as extra padding over bony prominences.  Compress with Tubigrip stocking.  Change dressings daily.  Site should be washed thoroughly on a daily basis using warm water and soap

## 2019-12-04 ENCOUNTER — OFFICE VISIT (OUTPATIENT)
Dept: WOUND CARE | Facility: HOSPITAL | Age: 59
End: 2019-12-04
Attending: SURGERY
Payer: COMMERCIAL

## 2019-12-04 VITALS — HEART RATE: 72 BPM | RESPIRATION RATE: 18 BRPM | TEMPERATURE: 96.9 F

## 2019-12-04 DIAGNOSIS — L90.9 ATROPHIC CONDITION OF SKIN: ICD-10-CM

## 2019-12-04 DIAGNOSIS — S91.002D OPEN WOUND OF LEFT ANKLE, SUBSEQUENT ENCOUNTER: Primary | ICD-10-CM

## 2019-12-04 DIAGNOSIS — R60.0 EDEMA OF LOWER EXTREMITY: ICD-10-CM

## 2019-12-04 PROCEDURE — 99213 OFFICE O/P EST LOW 20 MIN: CPT | Performed by: SURGERY

## 2019-12-04 ASSESSMENT — ENCOUNTER SYMPTOMS
FREQUENCY: 0
CONFUSION: 0
JOINT SWELLING: 0
ABDOMINAL DISTENTION: 0
FATIGUE: 0
RHINORRHEA: 0
POLYDIPSIA: 0
APPETITE CHANGE: 0
HEMATURIA: 0
NUMBNESS: 0
SHORTNESS OF BREATH: 0
FEVER: 0
COUGH: 0
NERVOUS/ANXIOUS: 0
WOUND: 1
FLANK PAIN: 0
EYE DISCHARGE: 0
BRUISES/BLEEDS EASILY: 0
EYE REDNESS: 0
WEAKNESS: 0
COLOR CHANGE: 0

## 2019-12-04 NOTE — LETTER
2019     Harika Choi MD  [address]  [address]  [place] [address]    Patient: Elizabeth Pleitez  YOB: 1960  Date of Visit: 2019      Dear Dr. Choi:    Thank you for referring Elizabeth Pleitez to me for evaluation. Below are my notes for this consultation.    If you have questions, please do not hesitate to call me. I look forward to following your patient along with you.         Sincerely,        BECCA Simms MD        CC: DO Mendez Hubbard W Randall, MD  2019  9:27 AM  Signed  Patient ID: Elizabeth Pleitez                              : 1960  MRN: [id number]                                            Visit Date: 2019  Encounter Provider: BECCA Simms  Referring Provider: No ref. provider found    Subjective      HPI  Elizabeth Pleitze is a 59 y.o. old female with a chief compliant of Traumatic Wound (Medial and lateral left calf)  presenting today for evaluation and management of a traumatic wound of her left ankle.  The patient is now weightbearing on her left lower extremity and still using crutches.  Discomfort has subsided somewhat.  She follows up with Dr. Berman in 2020.      The following have been reviewed and updated as appropriate in this visit:  Tobacco  Allergies  Meds  Problems  Med Hx  Surg Hx  Fam Hx  Soc Hx        Past Medical History:  has a past medical history of Abnormal Pap smear of cervix and Clicking tinnitus of right ear.  Past Surgical History:  has a past surgical history that includes Cervical biopsy w/ loop electrode excision; Colposcopy; and Cervical biopsy w/ loop electrode excision.  Social History:  reports that she has quit smoking. She has never used smokeless tobacco. She reports that she does not drink alcohol or use drugs.  Family History: family history includes Diabetes in her biological father; Heart disease in her biological mother; Lung cancer in her biological father;  Osteoporosis in her biological mother.  Medications:   Current Outpatient Medications:   •  ascorbate calcium (VITAMIN C ORAL), Take by mouth., Disp: , Rfl:   •  aspirin 325 mg EC tablet, Take 325 mg by mouth daily., Disp: , Rfl:   •  cholecalciferol, vitamin D3, 5,000 unit (125 mcg) capsule, Take 5,000 Units by mouth daily., Disp: , Rfl:   •  cranberry extract 425 mg capsule, Take by mouth daily., Disp: , Rfl:   •  diphenhydrAMINE (BENADRYL) 25 mg capsule, Take 25 mg by mouth every 6 (six) hours as needed for itching., Disp: , Rfl:   •  MULTIVITAMIN ORAL, Take by mouth., Disp: , Rfl:   •  silver sulfadiazine (SILVADENE) 1 % cream, Apply topically daily., Disp: 85 g, Rfl: 3  •  vitamin E acetate (VITAMIN E ORAL), Take by mouth., Disp: , Rfl:     Allergies: is allergic to penicillins and penicillins.     Review of Systems   Constitutional: Negative for appetite change, fatigue and fever.   HENT: Negative for congestion and rhinorrhea.    Eyes: Negative for discharge and redness.   Respiratory: Negative for cough and shortness of breath.    Cardiovascular: Positive for leg swelling.   Gastrointestinal: Negative for abdominal distention.   Endocrine: Negative for cold intolerance, heat intolerance and polydipsia.   Genitourinary: Negative for flank pain, frequency and hematuria.   Musculoskeletal: Positive for gait problem. Negative for joint swelling.   Skin: Positive for wound. Negative for color change.   Allergic/Immunologic: Negative for immunocompromised state.   Neurological: Negative for weakness and numbness.   Hematological: Does not bruise/bleed easily.   Psychiatric/Behavioral: Negative for confusion. The patient is not nervous/anxious.      Objective   There were no vitals taken for this visit.    Physical Exam   Constitutional: She is oriented to person, place, and time. She appears well-developed. She is cooperative. No distress.   HENT:   Head: Normocephalic and atraumatic.   Eyes: Pupils are equal,  round, and reactive to light. Conjunctivae are normal.   Neck: Trachea normal and normal range of motion. No edema and no erythema present.   Cardiovascular: Regular rhythm and intact distal pulses.   Pulmonary/Chest: Effort normal. No respiratory distress. She has no wheezes.   Abdominal: Soft. Normal appearance. There is no guarding.   Musculoskeletal: She exhibits edema and tenderness. She exhibits no deformity.   Neurological: She is alert and oriented to person, place, and time.   Skin:        Psychiatric: Her speech is normal and behavior is normal. Her affect is not inappropriate. Cognition and memory are normal. She is attentive.   Nursing note and vitals reviewed.                  Left medial calf                                                                left lateral calf                Assessment/Plan    Diagnosis Plan   1. Open wound of left ankle, subsequent encounter     2. Edema of lower extremity     3. Atrophic condition of skin       Problem List Items Addressed This Visit        Musculoskeletal    Edema of lower extremity     PLAN:  Tubigrip D is preferred method of compression for lower extremities  Maintain leg elevation above level of the heart as much as possible.  Restrict fluid intake to < 66oz/24 hours  Restrict salt intake to no more than 2817-8531 mgs/sodium/day  Minimize prolonged sitting and standing  Remain active to point of physical tolerance         Open wound of left ankle - Primary     Plan:  Continue applying Silvadene cream and gauze dressings to all wound sites.  Apply PolyMem foam as extra padding over bony prominences.  Compress with Tubigrip stocking.  Change dressings daily.  Site should be washed thoroughly on a daily basis using warm water and soap            Other    Atrophic condition of skin     PLAN:  Eucerin Original Healing Cream to dry skin of legs daily.     It is recommended that you clean the wound using running warm water, a mild bath soap, and a soft wash  cloth or bath sponge. Proper cleansing of your wound is essential for wound healing to take place. Inadequate cleansing leads to the build up of yellow or green material in the wound bed, providing overgrowth of bacteria and noxious bacterial by products.               Return to wound center as needed if medial wound has not resolved in 2 to 3 weeks     BECCA Simms MD

## 2019-12-04 NOTE — PROGRESS NOTES
Patient ID: Elizabeth Pleitez                              : 1960  MRN: [id number]                                            Visit Date: 2019  Encounter Provider: BECCA Simms  Referring Provider: No ref. provider found    Subjective      HPI  Elizabeth Pleitez is a 59 y.o. old female with a chief compliant of Traumatic Wound (Medial and lateral left calf)  presenting today for evaluation and management of a traumatic wound of her left ankle.  The patient is now weightbearing on her left lower extremity and still using crutches.  Discomfort has subsided somewhat.  She follows up with Dr. Berman in 2020.      The following have been reviewed and updated as appropriate in this visit:  Tobacco  Allergies  Meds  Problems  Med Hx  Surg Hx  Fam Hx  Soc Hx        Past Medical History:  has a past medical history of Abnormal Pap smear of cervix and Clicking tinnitus of right ear.  Past Surgical History:  has a past surgical history that includes Cervical biopsy w/ loop electrode excision; Colposcopy; and Cervical biopsy w/ loop electrode excision.  Social History:  reports that she has quit smoking. She has never used smokeless tobacco. She reports that she does not drink alcohol or use drugs.  Family History: family history includes Diabetes in her biological father; Heart disease in her biological mother; Lung cancer in her biological father; Osteoporosis in her biological mother.  Medications:   Current Outpatient Medications:   •  ascorbate calcium (VITAMIN C ORAL), Take by mouth., Disp: , Rfl:   •  aspirin 325 mg EC tablet, Take 325 mg by mouth daily., Disp: , Rfl:   •  cholecalciferol, vitamin D3, 5,000 unit (125 mcg) capsule, Take 5,000 Units by mouth daily., Disp: , Rfl:   •  cranberry extract 425 mg capsule, Take by mouth daily., Disp: , Rfl:   •  diphenhydrAMINE (BENADRYL) 25 mg capsule, Take 25 mg by mouth every 6 (six) hours as needed for itching., Disp: , Rfl:   •  MULTIVITAMIN  ORAL, Take by mouth., Disp: , Rfl:   •  silver sulfadiazine (SILVADENE) 1 % cream, Apply topically daily., Disp: 85 g, Rfl: 3  •  vitamin E acetate (VITAMIN E ORAL), Take by mouth., Disp: , Rfl:     Allergies: is allergic to penicillins and penicillins.     Review of Systems   Constitutional: Negative for appetite change, fatigue and fever.   HENT: Negative for congestion and rhinorrhea.    Eyes: Negative for discharge and redness.   Respiratory: Negative for cough and shortness of breath.    Cardiovascular: Positive for leg swelling.   Gastrointestinal: Negative for abdominal distention.   Endocrine: Negative for cold intolerance, heat intolerance and polydipsia.   Genitourinary: Negative for flank pain, frequency and hematuria.   Musculoskeletal: Positive for gait problem. Negative for joint swelling.   Skin: Positive for wound. Negative for color change.   Allergic/Immunologic: Negative for immunocompromised state.   Neurological: Negative for weakness and numbness.   Hematological: Does not bruise/bleed easily.   Psychiatric/Behavioral: Negative for confusion. The patient is not nervous/anxious.      Objective   There were no vitals taken for this visit.    Physical Exam   Constitutional: She is oriented to person, place, and time. She appears well-developed. She is cooperative. No distress.   HENT:   Head: Normocephalic and atraumatic.   Eyes: Pupils are equal, round, and reactive to light. Conjunctivae are normal.   Neck: Trachea normal and normal range of motion. No edema and no erythema present.   Cardiovascular: Regular rhythm and intact distal pulses.   Pulmonary/Chest: Effort normal. No respiratory distress. She has no wheezes.   Abdominal: Soft. Normal appearance. There is no guarding.   Musculoskeletal: She exhibits edema and tenderness. She exhibits no deformity.   Neurological: She is alert and oriented to person, place, and time.   Skin:        Psychiatric: Her speech is normal and behavior is  normal. Her affect is not inappropriate. Cognition and memory are normal. She is attentive.   Nursing note and vitals reviewed.                  Left medial calf                                                                left lateral calf                Assessment/Plan    Diagnosis Plan   1. Open wound of left ankle, subsequent encounter     2. Edema of lower extremity     3. Atrophic condition of skin       Problem List Items Addressed This Visit        Musculoskeletal    Edema of lower extremity     PLAN:  Tubigrip D is preferred method of compression for lower extremities  Maintain leg elevation above level of the heart as much as possible.  Restrict fluid intake to < 66oz/24 hours  Restrict salt intake to no more than 8404-2963 mgs/sodium/day  Minimize prolonged sitting and standing  Remain active to point of physical tolerance         Open wound of left ankle - Primary     Plan:  Continue applying Silvadene cream and gauze dressings to all wound sites.  Apply PolyMem foam as extra padding over bony prominences.  Compress with Tubigrip stocking.  Change dressings daily.  Site should be washed thoroughly on a daily basis using warm water and soap            Other    Atrophic condition of skin     PLAN:  Eucerin Original Healing Cream to dry skin of legs daily.     It is recommended that you clean the wound using running warm water, a mild bath soap, and a soft wash cloth or bath sponge. Proper cleansing of your wound is essential for wound healing to take place. Inadequate cleansing leads to the build up of yellow or green material in the wound bed, providing overgrowth of bacteria and noxious bacterial by products.               Return to wound center as needed if medial wound has not resolved in 2 to 3 weeks     BECCA Simms MD

## 2019-12-04 NOTE — ASSESSMENT & PLAN NOTE
PLAN:  Tubigrip D is preferred method of compression for lower extremities  Maintain leg elevation above level of the heart as much as possible.  Restrict fluid intake to < 66oz/24 hours  Restrict salt intake to no more than 1816-6381 mgs/sodium/day  Minimize prolonged sitting and standing  Remain active to point of physical tolerance

## 2019-12-04 NOTE — PATIENT INSTRUCTIONS
Wound Healing Center Instructions    MEDICATIONS     Medication Note  Continue present medications as prescribed by the Wound Healing Center or other physicians you see. To avoid any problems keep the Wound Healing Center informed each visit of any medications changes that occur.     WOUND CARE     Clean Wound with: Soap and Water and Showering     Treatment 1   Location: Left leg wound ,medial    Dressing: Apply silvadene cream to a gauze pad then apply to areas,wrap with rosie and secure with tape.                 Then apply a tubigrip stocking.                  Apply a moisturizer to all intact skin.                 May apply polymen foam over dressings for padding if needed, purchase polymen foam on [employer]     Dressing Care Frequency: Daily     Care Provider: Self     COMPRESSION THERAPY     Tubigrip Stockings  Left Leg size D  • Apply Tubigrips (cotton/compression stockinette) DAILY.  • Apply stockings upon arising in the morning to obtain the best results.  • Remove stockings at bedtime once your legs are elevated.  • Do not allow the stockinette to roll down as it can reduce or interrupt the blood flow in your limb.  • Always wear the appropriate size that is recommended for you at the [facility].  • Tubigrips can be washed by hand with soap and water and hang to dry overnight.         Basic Principles      • Wash your hands thoroughly with soap and water and after each dressing change. If someone other than the patient changes the dressing, it’s best to wear disposable gloves.   • Do not get the wound or dressing wet.   • To shower: remove the dressing, shower with soap and water (including washing the wound - do not use a washcloth), air dry, then redress the wound.  • Do not take a tub bath  • Keep all your dressings in a clean, covered container at home to avoid dust and contamination.  • Discard used dressings in a plastic bag or covered trash container.  • Check your wound and the surrounding skin at each  dressing change for redness, warmth, swelling, increased pain, foul odor, fever, pus or abnormal drainage or discharge.  • Notify Wound Healing Center if any of these changes occur - Dept: [phone].        Nutrition  • Eat a well, balanced diet with adequate protein to support wound healing.   • Take a multivitamin every day. Adequate nutrition supports healing and new tissue growth.  • All diabetic patients should strive to keep blood sugars within a normal, practical range.   • Elevated blood sugars can delay your wound healing.        ACTIVITY     Elevate legs above level of heart   Elevate your legs above the level of your heart for specific time periods during the day on several firm pillows or a foam leg wedge  Use of a recliner chair at home can often help in the appropriate elevation of your legs above the level of your heart  Normal activity then rest and elevation  May shower    MOBILITY     per ortho

## 2020-12-28 ENCOUNTER — TELEPHONE (OUTPATIENT)
Dept: PRIMARY CARE | Facility: CLINIC | Age: 60
End: 2020-12-28

## 2020-12-28 DIAGNOSIS — Z12.11 COLON CANCER SCREENING: Primary | ICD-10-CM

## 2020-12-28 NOTE — TELEPHONE ENCOUNTER
LMOM asking for a call back regarding the below message.  PHI outdated       New FIT was put in the mail

## 2020-12-28 NOTE — TELEPHONE ENCOUNTER
Please call pt. Appears she sent in FIT test but sample was too old so the lab did not run it. Please mail another FIT

## 2021-02-27 LAB
HEMOCCULT STL QL IA: NEGATIVE
SPECIMEN STATUS: NORMAL

## 2021-11-04 ENCOUNTER — APPOINTMENT (RX ONLY)
Dept: URBAN - METROPOLITAN AREA CLINIC 374 | Facility: CLINIC | Age: 61
Setting detail: DERMATOLOGY
End: 2021-11-04

## 2021-11-04 DIAGNOSIS — D485 NEOPLASM OF UNCERTAIN BEHAVIOR OF SKIN: ICD-10-CM

## 2021-11-04 DIAGNOSIS — L21.8 OTHER SEBORRHEIC DERMATITIS: ICD-10-CM

## 2021-11-04 PROBLEM — D48.5 NEOPLASM OF UNCERTAIN BEHAVIOR OF SKIN: Status: ACTIVE | Noted: 2021-11-04

## 2021-11-04 PROCEDURE — ? MEDICATION COUNSELING

## 2021-11-04 PROCEDURE — 99214 OFFICE O/P EST MOD 30 MIN: CPT | Mod: 25

## 2021-11-04 PROCEDURE — ? PHOTO-DOCUMENTATION

## 2021-11-04 PROCEDURE — ? BIOPSY BY SHAVE METHOD

## 2021-11-04 PROCEDURE — ? PRESCRIPTION

## 2021-11-04 PROCEDURE — 11102 TANGNTL BX SKIN SINGLE LES: CPT

## 2021-11-04 PROCEDURE — ? PRESCRIPTION MEDICATION MANAGEMENT

## 2021-11-04 PROCEDURE — ? COUNSELING

## 2021-11-04 PROCEDURE — 11103 TANGNTL BX SKIN EA SEP/ADDL: CPT

## 2021-11-04 RX ORDER — TRIAMCINOLONE ACETONIDE 1 MG/ML
LOTION TOPICAL BID
Qty: 60 | Refills: 3 | Status: ERX | COMMUNITY
Start: 2021-11-04

## 2021-11-04 RX ADMIN — TRIAMCINOLONE ACETONIDE: 1 LOTION TOPICAL at 00:00

## 2021-11-04 ASSESSMENT — LOCATION SIMPLE DESCRIPTION DERM
LOCATION SIMPLE: SCALP
LOCATION SIMPLE: RIGHT TEMPLE

## 2021-11-04 ASSESSMENT — LOCATION ZONE DERM
LOCATION ZONE: SCALP
LOCATION ZONE: FACE

## 2021-11-04 ASSESSMENT — LOCATION DETAILED DESCRIPTION DERM
LOCATION DETAILED: RIGHT LATERAL TEMPLE
LOCATION DETAILED: RIGHT INFERIOR FRONTAL SCALP

## 2021-11-04 NOTE — PROCEDURE: MEDICATION COUNSELING
Labs from outside hospital have been scanned in. Pt has a negative covid swab from outside facility. Pts outside imaging have also been sent to the film room. Pt also has a med list which Pharmacy tech was handed and one is in the chart.    Picato Counseling:  I discussed with the patient the risks of Picato including but not limited to erythema, scaling, itching, weeping, crusting, and pain.

## 2021-11-04 NOTE — PROCEDURE: PRESCRIPTION MEDICATION MANAGEMENT
Initiate Treatment: triamcinolone acetonide 0.1 % lotion: Apply a few scattered drop bid to AA of scalp until clear then D/C and restart PRN flare up
Detail Level: Zone
Render In Strict Bullet Format?: No

## 2021-12-07 ENCOUNTER — APPOINTMENT (RX ONLY)
Dept: URBAN - METROPOLITAN AREA CLINIC 26 | Facility: CLINIC | Age: 61
Setting detail: DERMATOLOGY
End: 2021-12-07

## 2021-12-07 PROBLEM — C44.311 BASAL CELL CARCINOMA OF SKIN OF NOSE: Status: ACTIVE | Noted: 2021-12-07

## 2021-12-07 PROCEDURE — ? ADDITIONAL NOTES

## 2021-12-07 PROCEDURE — 17311 MOHS 1 STAGE H/N/HF/G: CPT

## 2021-12-07 PROCEDURE — ? MOHS SURGERY

## 2021-12-07 PROCEDURE — 17312 MOHS ADDL STAGE: CPT

## 2021-12-09 ENCOUNTER — OFFICE VISIT (OUTPATIENT)
Dept: PRIMARY CARE | Facility: CLINIC | Age: 61
End: 2021-12-09
Payer: COMMERCIAL

## 2021-12-09 VITALS
HEIGHT: 65 IN | RESPIRATION RATE: 12 BRPM | DIASTOLIC BLOOD PRESSURE: 80 MMHG | HEART RATE: 68 BPM | SYSTOLIC BLOOD PRESSURE: 130 MMHG | BODY MASS INDEX: 27.02 KG/M2 | OXYGEN SATURATION: 98 % | TEMPERATURE: 97.8 F | WEIGHT: 162.2 LBS

## 2021-12-09 DIAGNOSIS — Z00.00 ENCOUNTER FOR GENERAL ADULT MEDICAL EXAMINATION WITHOUT ABNORMAL FINDINGS: Primary | ICD-10-CM

## 2021-12-09 DIAGNOSIS — Z12.31 ENCOUNTER FOR SCREENING MAMMOGRAM FOR MALIGNANT NEOPLASM OF BREAST: ICD-10-CM

## 2021-12-09 DIAGNOSIS — Z12.11 COLON CANCER SCREENING: ICD-10-CM

## 2021-12-09 DIAGNOSIS — C44.311 BASAL CELL CARCINOMA (BCC) OF SKIN OF NOSE: ICD-10-CM

## 2021-12-09 PROCEDURE — 90750 HZV VACC RECOMBINANT IM: CPT | Performed by: FAMILY MEDICINE

## 2021-12-09 PROCEDURE — 99396 PREV VISIT EST AGE 40-64: CPT | Mod: 25 | Performed by: FAMILY MEDICINE

## 2021-12-09 PROCEDURE — 3008F BODY MASS INDEX DOCD: CPT | Performed by: FAMILY MEDICINE

## 2021-12-09 PROCEDURE — 90471 IMMUNIZATION ADMIN: CPT | Performed by: FAMILY MEDICINE

## 2021-12-09 ASSESSMENT — ENCOUNTER SYMPTOMS
CHILLS: 0
FREQUENCY: 0
DYSURIA: 0
JOINT SWELLING: 1
RHINORRHEA: 0
SHORTNESS OF BREATH: 0
MYALGIAS: 0
EYE REDNESS: 0
DIAPHORESIS: 0
COUGH: 0
LIGHT-HEADEDNESS: 0
NAUSEA: 0
FEVER: 0
HEADACHES: 0
DIZZINESS: 0
FATIGUE: 0
DIARRHEA: 0
SORE THROAT: 0
AGITATION: 0
DIFFICULTY URINATING: 0
WHEEZING: 0
BLOOD IN STOOL: 0
ABDOMINAL PAIN: 0
BACK PAIN: 0
CONFUSION: 0
CONSTIPATION: 0
WOUND: 1
ARTHRALGIAS: 0

## 2021-12-09 NOTE — PROGRESS NOTES
"      Subjective      Patient ID: Elizabeth Pleitez is a 61 y.o. female.  1960      HPI in office for physical     Concerns: None     Hospitalizations: None     Dentist: utd     Opthal: due-\"see floaters sometimes\" -wears glasses     GYN: due     Mammo: due     Colonoscopy: negative FIT test 2/2021    Derm: Moh's procedure on nose-basal cell carcinoma-recovering well-clean dry dressing intact. Pt denied seeing s/s of infection.      The following have been reviewed and updated as appropriate in this visit:   Allergies  Meds  Problems       Review of Systems   Constitutional: Negative for chills, diaphoresis, fatigue and fever.   HENT: Negative for congestion, ear pain, hearing loss, rhinorrhea and sore throat.    Eyes: Positive for visual disturbance. Negative for redness.        \"see floaters sometimes\"   Respiratory: Negative for cough, shortness of breath and wheezing.    Cardiovascular: Negative for chest pain and leg swelling.   Gastrointestinal: Negative for abdominal pain, blood in stool, constipation, diarrhea and nausea.   Genitourinary: Negative for difficulty urinating, dysuria, frequency and urgency.   Musculoskeletal: Positive for joint swelling. Negative for arthralgias, back pain and myalgias.        L ankle always bigger than R ankle-history of injury   Skin: Positive for wound.        Recent Moh's procedure-R side of nose-basal cell-clean, dry dressing intact.    Neurological: Negative for dizziness, light-headedness and headaches.   Psychiatric/Behavioral: Negative for agitation, behavioral problems and confusion.       Objective     Vitals:    12/09/21 1337   BP: 130/80   Pulse: 68   Resp: 12   Temp: 36.6 °C (97.8 °F)   SpO2: 98%   Weight: 73.6 kg (162 lb 3.2 oz)   Height: 1.651 m (5' 5\")     Body mass index is 26.99 kg/m².    Physical Exam  Vitals reviewed.   Constitutional:       General: She is not in acute distress.     Appearance: Normal appearance. She is not ill-appearing.   HENT:      " "Head: Normocephalic and atraumatic.      Right Ear: Tympanic membrane, ear canal and external ear normal. There is no impacted cerumen.      Left Ear: Tympanic membrane, ear canal and external ear normal. There is no impacted cerumen.      Nose: Nose normal. No congestion or rhinorrhea.      Mouth/Throat:      Mouth: Mucous membranes are moist.      Pharynx: Oropharynx is clear. No oropharyngeal exudate or posterior oropharyngeal erythema.   Eyes:      General: No scleral icterus.     Conjunctiva/sclera: Conjunctivae normal.      Pupils: Pupils are equal, round, and reactive to light.   Cardiovascular:      Rate and Rhythm: Normal rate and regular rhythm.      Heart sounds: Normal heart sounds. No murmur heard.    No friction rub. No gallop.   Pulmonary:      Effort: Pulmonary effort is normal.      Breath sounds: Normal breath sounds. No wheezing, rhonchi or rales.   Abdominal:      General: Abdomen is flat. Bowel sounds are normal.      Tenderness: There is no abdominal tenderness. There is no guarding or rebound.   Musculoskeletal:         General: Normal range of motion.      Cervical back: Normal range of motion and neck supple. No tenderness.      Right lower leg: No edema.      Left lower leg: Edema present.      Comments: \"chronic\" history of injury    Lymphadenopathy:      Cervical: No cervical adenopathy.   Skin:     General: Skin is warm and dry.      Comments: Dressing on left side of nose-no sign of drainage-patient denies s/s of infection    Neurological:      General: No focal deficit present.      Mental Status: She is alert and oriented to person, place, and time.   Psychiatric:         Mood and Affect: Mood normal.         Behavior: Behavior normal.         Thought Content: Thought content normal.         Assessment/Plan   Diagnoses and all orders for this visit:    Encounter for general adult medical examination without abnormal findings (Primary)  Comments:  utd dentist   due opthal   due gyn "   due mammo   utd derm   Check cbc, cmp, lipid, tsh  Shingrix dose 1 today   Orders:  -     CBC and Differential; Future  -     Comprehensive metabolic panel; Future  -     Lipid panel; Future  -     TSH 3rd Generation; Future    Basal cell carcinoma (BCC) of skin of nose    Encounter for screening mammogram for malignant neoplasm of breast  Comments:  Mammogram ordered   Orders:  -     BI SCREENING MAMMOGRAM BILATERAL(TOMOSYNTHESIS); Future    Colon cancer screening  Comments:  FIT test ordered-pt given supplies   encouraged cscope  also discussed cologaurd option  Orders:  -     FIT; Future    Other orders  -     Shingrix

## 2021-12-09 NOTE — PATIENT INSTRUCTIONS
Schedule eye exam. If the floaters worsen, follow-up with them ASAP  Schedule GYN  Schedule mammogram  Complete stool test for colon cancer screening in February

## 2021-12-12 ASSESSMENT — ENCOUNTER SYMPTOMS
COUGH: 0
LIGHT-HEADEDNESS: 0
ABDOMINAL PAIN: 0
BLOOD IN STOOL: 0
DIARRHEA: 0
DYSURIA: 0
SHORTNESS OF BREATH: 0
CONSTIPATION: 0
HEADACHES: 0
BACK PAIN: 0
DYSPHORIC MOOD: 0
UNEXPECTED WEIGHT CHANGE: 0
NERVOUS/ANXIOUS: 0
PALPITATIONS: 0
FREQUENCY: 0
ARTHRALGIAS: 0
FEVER: 0

## 2021-12-13 NOTE — PROGRESS NOTES
Subjective      Patient ID: Elizabeth Pleitez is a 61 y.o. female.    HPI      IN office for physical    Please see NP student HPI- I agree with documented history       The following have been reviewed and updated as appropriate in this visit:    Allergies   Allergen Reactions   • Penicillins Hives   • Penicillins        Current Outpatient Medications   Medication Sig Dispense Refill   • ascorbate calcium (VITAMIN C ORAL) Take by mouth.     • cholecalciferol, vitamin D3, 5,000 unit (125 mcg) capsule Take 5,000 Units by mouth daily.     • cranberry extract 425 mg capsule Take by mouth daily.     • diphenhydrAMINE (BENADRYL) 25 mg capsule Take 25 mg by mouth every 6 (six) hours as needed for itching.     • MULTIVITAMIN ORAL Take by mouth.     • vitamin E acetate (VITAMIN E ORAL) Take by mouth.       No current facility-administered medications for this visit.       Past Medical History:   Diagnosis Date   • Abnormal Pap smear of cervix    • Clicking tinnitus of right ear      Past Surgical History:   Procedure Laterality Date   • CERVICAL BIOPSY  W/ LOOP ELECTRODE EXCISION      age late 30s   • CERVICAL BIOPSY  W/ LOOP ELECTRODE EXCISION     • COLPOSCOPY     • MOHS SURGERY      nose     Family History   Problem Relation Age of Onset   • Diabetes Biological Father    • Lung cancer Biological Father    • Heart disease Biological Mother    • Osteoporosis Biological Mother    • Breast cancer Neg Hx    • Colon cancer Neg Hx      Social History     Tobacco Use   • Smoking status: Former Smoker   • Smokeless tobacco: Never Used   Substance Use Topics   • Alcohol use: No   • Drug use: No       Review of Systems   Constitutional: Negative for fever and unexpected weight change.   HENT: Negative for ear pain and hearing loss.    Eyes: Positive for visual disturbance (floaters- not new).   Respiratory: Negative for cough and shortness of breath.    Cardiovascular: Negative for chest pain and palpitations.   Gastrointestinal:  "Negative for abdominal pain, blood in stool, constipation and diarrhea.   Genitourinary: Negative for dysuria and frequency.   Musculoskeletal: Negative for arthralgias and back pain.   Neurological: Negative for light-headedness and headaches.   Psychiatric/Behavioral: Negative for dysphoric mood. The patient is not nervous/anxious.          Visit Vitals  /80   Pulse 68   Temp 36.6 °C (97.8 °F)   Resp 12   Ht 1.651 m (5' 5\")   Wt 73.6 kg (162 lb 3.2 oz)   SpO2 98%   BMI 26.99 kg/m²     BP Readings from Last 3 Encounters:   12/09/21 130/80   10/05/19 (!) 107/53   09/28/19 (!) 116/56     Wt Readings from Last 3 Encounters:   12/09/21 73.6 kg (162 lb 3.2 oz)   10/05/19 68 kg (150 lb)   12/11/18 71.8 kg (158 lb 3.2 oz)       Physical Exam    Lab Results   Component Value Date    WBC 6.22 10/05/2019    HGB 12.1 10/05/2019    HCT 38.3 10/05/2019    MCV 94.1 10/05/2019     10/05/2019     Lab Results   Component Value Date    GLUCOSE 108 (H) 10/05/2019    CALCIUM 9.2 10/05/2019     10/05/2019    K 3.9 10/05/2019    CO2 28 10/05/2019     10/05/2019    BUN 10 10/05/2019    CREATININE 0.7 10/05/2019     Lab Results   Component Value Date    CHOL 203 (H) 12/22/2018    TRIG 85 12/22/2018    HDL 60 12/22/2018    VLDL 17 12/22/2018    LDLCALC 126 (H) 12/22/2018     No results found for: HGBA1C  Lab Results   Component Value Date    TSH 3.530 12/22/2018       Assessment/Plan   Diagnoses and all orders for this visit:    Encounter for general adult medical examination without abnormal findings (Primary)  Comments:  utd dentist   due opthal   due gyn   due mammo   utd derm   Check cbc, cmp, lipid, tsh  Shingrix dose 1 today   Orders:  -     CBC and Differential; Future  -     Comprehensive metabolic panel; Future  -     Lipid panel; Future  -     TSH 3rd Generation; Future    Basal cell carcinoma (BCC) of skin of nose    Encounter for screening mammogram for malignant neoplasm of breast  Comments:  Mammogram " ordered   Orders:  -     BI SCREENING MAMMOGRAM BILATERAL(TOMOSYNTHESIS); Future    Colon cancer screening  Comments:  FIT test ordered-pt given supplies   encouraged cscope  also discussed cologaurd option  Orders:  -     FIT; Future    Other orders  -     Shingrix

## 2022-01-08 ENCOUNTER — HOSPITAL ENCOUNTER (OUTPATIENT)
Dept: RADIOLOGY | Facility: HOSPITAL | Age: 62
Discharge: HOME | End: 2022-01-08
Attending: FAMILY MEDICINE
Payer: COMMERCIAL

## 2022-01-08 DIAGNOSIS — Z12.31 ENCOUNTER FOR SCREENING MAMMOGRAM FOR MALIGNANT NEOPLASM OF BREAST: ICD-10-CM

## 2022-01-08 PROCEDURE — 77063 BREAST TOMOSYNTHESIS BI: CPT

## 2022-01-08 PROCEDURE — 77067 SCR MAMMO BI INCL CAD: CPT

## 2022-01-10 LAB — SPECIMEN STATUS: NORMAL

## 2022-01-11 ENCOUNTER — TELEPHONE (OUTPATIENT)
Dept: PRIMARY CARE | Facility: CLINIC | Age: 62
End: 2022-01-11
Payer: COMMERCIAL

## 2022-01-11 DIAGNOSIS — Z12.11 COLON CANCER SCREENING: Primary | ICD-10-CM

## 2022-01-11 NOTE — TELEPHONE ENCOUNTER
Please call the pt and let her know that the stool test was canceled bc the sample did not make it to the lab in time. I recommend she repeat the same and drop if off at the lab instead of mailing it. Please send new FIT

## 2022-01-15 NOTE — PROCEDURE: MOHS SURGERY
denies pain/discomfort V-Y Flap Text: The defect edges were debeveled with a #15 scalpel blade.  Given the location of the defect, shape of the defect and the proximity to free margins a V-Y flap was deemed most appropriate.  Using a sterile surgical marker, an appropriate advancement flap was drawn incorporating the defect and placing the expected incisions within the relaxed skin tension lines where possible.    The area thus outlined was incised deep to adipose tissue with a #15 scalpel blade.  The skin margins were undermined to an appropriate distance in all directions utilizing iris scissors.

## 2022-02-06 LAB
ALBUMIN SERPL-MCNC: 4.5 G/DL (ref 3.8–4.8)
ALBUMIN/GLOB SERPL: 2 {RATIO} (ref 1.2–2.2)
ALP SERPL-CCNC: 84 IU/L (ref 44–121)
ALT SERPL-CCNC: 17 IU/L (ref 0–32)
AST SERPL-CCNC: 20 IU/L (ref 0–40)
BASOPHILS # BLD AUTO: 0.1 X10E3/UL (ref 0–0.2)
BASOPHILS NFR BLD AUTO: 1 %
BILIRUB SERPL-MCNC: 0.4 MG/DL (ref 0–1.2)
BUN SERPL-MCNC: 10 MG/DL (ref 8–27)
BUN/CREAT SERPL: 12 (ref 12–28)
CALCIUM SERPL-MCNC: 9.8 MG/DL (ref 8.7–10.3)
CHLORIDE SERPL-SCNC: 102 MMOL/L (ref 96–106)
CHOLEST SERPL-MCNC: 216 MG/DL (ref 100–199)
CO2 SERPL-SCNC: 25 MMOL/L (ref 20–29)
CREAT SERPL-MCNC: 0.85 MG/DL (ref 0.57–1)
EOSINOPHIL # BLD AUTO: 0.1 X10E3/UL (ref 0–0.4)
EOSINOPHIL NFR BLD AUTO: 2 %
ERYTHROCYTE [DISTWIDTH] IN BLOOD BY AUTOMATED COUNT: 12.8 % (ref 11.7–15.4)
GLOBULIN SER CALC-MCNC: 2.3 G/DL (ref 1.5–4.5)
GLUCOSE SERPL-MCNC: 98 MG/DL (ref 65–99)
HCT VFR BLD AUTO: 44.4 % (ref 34–46.6)
HDLC SERPL-MCNC: 49 MG/DL
HGB BLD-MCNC: 14.4 G/DL (ref 11.1–15.9)
IMM GRANULOCYTES # BLD AUTO: 0 X10E3/UL (ref 0–0.1)
IMM GRANULOCYTES NFR BLD AUTO: 0 %
LAB CORP EGFR IF AFRICN AM: 84 ML/MIN/1.73
LAB CORP EGFR IF NONAFRICN AM: 73 ML/MIN/1.73
LDLC SERPL CALC-MCNC: 151 MG/DL (ref 0–99)
LYMPHOCYTES # BLD AUTO: 1.9 X10E3/UL (ref 0.7–3.1)
LYMPHOCYTES NFR BLD AUTO: 31 %
MCH RBC QN AUTO: 30.2 PG (ref 26.6–33)
MCHC RBC AUTO-ENTMCNC: 32.4 G/DL (ref 31.5–35.7)
MCV RBC AUTO: 93 FL (ref 79–97)
MONOCYTES # BLD AUTO: 0.6 X10E3/UL (ref 0.1–0.9)
MONOCYTES NFR BLD AUTO: 10 %
NEUTROPHILS # BLD AUTO: 3.4 X10E3/UL (ref 1.4–7)
NEUTROPHILS NFR BLD AUTO: 56 %
PLATELET # BLD AUTO: 308 X10E3/UL (ref 150–450)
POTASSIUM SERPL-SCNC: 4.5 MMOL/L (ref 3.5–5.2)
PROT SERPL-MCNC: 6.8 G/DL (ref 6–8.5)
RBC # BLD AUTO: 4.77 X10E6/UL (ref 3.77–5.28)
SODIUM SERPL-SCNC: 141 MMOL/L (ref 134–144)
TRIGL SERPL-MCNC: 89 MG/DL (ref 0–149)
TSH SERPL DL<=0.005 MIU/L-ACNC: 2.02 UIU/ML (ref 0.45–4.5)
VLDLC SERPL CALC-MCNC: 16 MG/DL (ref 5–40)
WBC # BLD AUTO: 6 X10E3/UL (ref 3.4–10.8)

## 2022-02-07 LAB — HEMOCCULT STL QL IA: NEGATIVE

## 2022-03-10 ENCOUNTER — OFFICE VISIT (OUTPATIENT)
Dept: PRIMARY CARE | Facility: CLINIC | Age: 62
End: 2022-03-10
Payer: COMMERCIAL

## 2022-03-10 DIAGNOSIS — Z01.419 ENCOUNTER FOR GYNECOLOGICAL EXAMINATION: Primary | ICD-10-CM

## 2022-03-10 PROCEDURE — 90750 HZV VACC RECOMBINANT IM: CPT | Performed by: FAMILY MEDICINE

## 2022-03-10 PROCEDURE — 90471 IMMUNIZATION ADMIN: CPT | Performed by: FAMILY MEDICINE

## 2022-03-10 PROCEDURE — S0612 ANNUAL GYNECOLOGICAL EXAMINA: HCPCS | Performed by: FAMILY MEDICINE

## 2022-03-10 NOTE — PROGRESS NOTES
Elizabeth Pleitez is a 61 y.o. female.    HPI    In office for GYN exam    Vaginal complaints: none     Breast complaints: none    Postmenopausal- no bleeding       Mammogram: utd- 1/2022        The following have been reviewed and updated as appropriate in this visit:    Allergies   Allergen Reactions   • Penicillins Hives   • Penicillins        Current Outpatient Medications   Medication Sig Dispense Refill   • ascorbate calcium (VITAMIN C ORAL) Take by mouth.     • cholecalciferol, vitamin D3, 5,000 unit (125 mcg) capsule Take 5,000 Units by mouth daily.     • cranberry extract 425 mg capsule Take by mouth daily.     • diphenhydrAMINE (BENADRYL) 25 mg capsule Take 25 mg by mouth every 6 (six) hours as needed for itching.     • MULTIVITAMIN ORAL Take by mouth.     • vitamin E acetate (VITAMIN E ORAL) Take by mouth.       No current facility-administered medications for this visit.       Past Medical History:   Diagnosis Date   • Abnormal Pap smear of cervix    • Clicking tinnitus of right ear      Past Surgical History:   Procedure Laterality Date   • CERVICAL BIOPSY  W/ LOOP ELECTRODE EXCISION      age late 30s   • CERVICAL BIOPSY  W/ LOOP ELECTRODE EXCISION     • COLPOSCOPY     • MOHS SURGERY      nose     Family History   Problem Relation Age of Onset   • Diabetes Biological Father    • Lung cancer Biological Father    • Heart disease Biological Mother    • Osteoporosis Biological Mother    • Breast cancer Neg Hx    • Colon cancer Neg Hx      Social History     Tobacco Use   • Smoking status: Former Smoker   • Smokeless tobacco: Never Used   Substance Use Topics   • Alcohol use: No   • Drug use: No       Review of Systems   Gastrointestinal: Negative for abdominal pain.   Genitourinary: Negative for dysuria, frequency, menstrual problem, pelvic pain, vaginal discharge and vaginal pain.         There were no vitals taken for this visit.  BP Readings from Last 3 Encounters:   03/10/22 110/80   12/09/21 130/80    10/05/19 (!) 107/53     Wt Readings from Last 3 Encounters:   03/10/22 71.8 kg (158 lb 6.4 oz)   12/09/21 73.6 kg (162 lb 3.2 oz)   10/05/19 68 kg (150 lb)       Physical Exam  Constitutional:       Appearance: She is well-developed.   Chest:   Breasts:      Right: No inverted nipple, mass, nipple discharge, skin change, tenderness, axillary adenopathy or supraclavicular adenopathy.      Left: No inverted nipple, mass, nipple discharge, skin change, tenderness, axillary adenopathy or supraclavicular adenopathy.       Genitourinary:     Labia:         Right: No tenderness or lesion.         Left: No tenderness or lesion.       Vagina: Normal.      Cervix: No cervical motion tenderness, discharge or friability.      Uterus: Normal.       Adnexa:         Right: No mass, tenderness or fullness.          Left: No mass, tenderness or fullness.     Lymphadenopathy:      Upper Body:      Right upper body: No supraclavicular or axillary adenopathy.      Left upper body: No supraclavicular or axillary adenopathy.         Lab Results   Component Value Date    WBC 6.0 02/05/2022    HGB 14.4 02/05/2022    HCT 44.4 02/05/2022    MCV 93 02/05/2022     02/05/2022     Lab Results   Component Value Date    GLUCOSE 98 02/05/2022    CALCIUM 9.2 10/05/2019     02/05/2022    K 4.5 02/05/2022    CO2 25 02/05/2022     02/05/2022    BUN 10 02/05/2022    CREATININE 0.85 02/05/2022     Lab Results   Component Value Date    CHOL 216 (H) 02/05/2022    TRIG 89 02/05/2022    HDL 49 02/05/2022    VLDL 16 02/05/2022    LDLCALC 151 (H) 02/05/2022     No results found for: HGBA1C  Lab Results   Component Value Date    TSH 2.020 02/05/2022       Assessment/Plan   Problem List Items Addressed This Visit    None     Visit Diagnoses     Encounter for gynecological examination    -  Primary    thinprep and HPV  mammo utd    Relevant Orders    Cytology, Thinprep Pap (Completed)

## 2022-03-17 LAB
CYTOLOGIST CVX/VAG CYTO: NORMAL
CYTOLOGY CVX/VAG DOC CYTO: NORMAL
CYTOLOGY CVX/VAG DOC THIN PREP: NORMAL
DX ICD CODE: NORMAL
HPV I/H RISK 4 DNA CVX QL PROBE+SIG AMP: NEGATIVE
LAB CORP NOTE:: NORMAL
OTHER STN SPEC: NORMAL
STAT OF ADQ CVX/VAG CYTO-IMP: NORMAL

## 2022-03-25 NOTE — TELEPHONE ENCOUNTER
Please call pt- I am listed at this patient's PCP, but have never seen her---please schedule appt with me if she will be following with me. Thank you.    Post op gtt instructions reviewed with patient.

## 2022-03-27 ASSESSMENT — ENCOUNTER SYMPTOMS
FREQUENCY: 0
DYSURIA: 0
ABDOMINAL PAIN: 0

## 2022-07-08 NOTE — TELEPHONE ENCOUNTER
----- Message from Tisha Kingston DO sent at 12/24/2018 10:22 AM EST -----  Please let patient know her labs look good  
Spoke with pt and notify pt of results with message  
no

## 2022-08-05 NOTE — PROCEDURE: MOHS SURGERY
Hemigard Intro: Due to skin fragility and wound tension, it was decided to use HEMIGARD adhesive retention suture devices to permit a linear closure. The skin was cleaned and dried for a 6cm distance away from the wound. Excessive hair, if present, was removed to allow for adhesion. Orbicularis Oris Muscle Flap Text: The defect edges were debeveled with a #15 scalpel blade.  Given that the defect affected the competency of the oral sphincter an orbicularis oris muscle flap was deemed most appropriate to restore this competency and normal muscle function.  Using a sterile surgical marker, an appropriate flap was drawn incorporating the defect. The area thus outlined was incised with a #15 scalpel blade.

## 2023-02-20 ENCOUNTER — TELEPHONE (OUTPATIENT)
Dept: PRIMARY CARE | Facility: CLINIC | Age: 63
End: 2023-02-20
Payer: COMMERCIAL

## 2023-02-20 DIAGNOSIS — Z12.31 ENCOUNTER FOR SCREENING MAMMOGRAM FOR MALIGNANT NEOPLASM OF BREAST: Primary | ICD-10-CM

## 2023-02-20 NOTE — TELEPHONE ENCOUNTER
Patient sent in my chart message requesting an order for a mammogram. She will be going to [facility].

## 2023-02-25 ENCOUNTER — HOSPITAL ENCOUNTER (OUTPATIENT)
Dept: RADIOLOGY | Facility: HOSPITAL | Age: 63
Discharge: HOME | End: 2023-02-25
Attending: FAMILY MEDICINE
Payer: COMMERCIAL

## 2023-02-25 DIAGNOSIS — Z12.31 ENCOUNTER FOR SCREENING MAMMOGRAM FOR MALIGNANT NEOPLASM OF BREAST: ICD-10-CM

## 2023-02-25 PROCEDURE — 77063 BREAST TOMOSYNTHESIS BI: CPT

## 2023-03-15 NOTE — PROCEDURE: MEDICATION COUNSELING
Physical Therapy Visit    Visit Type: Daily Treatment Note  Visit: 2  Referring Provider: Tony Jones MD  Medical Diagnosis (from order): Diagnosis Information    Diagnosis  V43.65 (ICD-9-CM) - Z96.651 (ICD-10-CM) - S/P TKR (total knee replacement), right         SUBJECTIVE                                                                                                               Reports she has been using her CPM 4 x a day and using ice.  She has no joint pain but has incisional pain over her staples.  Reports she has been doing her HEP.  Her CPM is 0-67 degrees.  She follows up with MD on 3/24.  Reports she cannot tolerate the TEDS because they are too small and she had abrasions from the TEDS.        OBJECTIVE                                                                                                                     Range of Motion (ROM)   (degrees unless noted; active unless noted; norms in ( ); negative=lacking to 0, positive=beyond 0)  Knee:   - Flexion (150):      • Right:  77                        Treatment     Therapeutic Exercise  Nustep with UE at 8 -level 1, 6 min   Standing knee flexion on plyo-box step 5 sec hold 10 x   Standing hamstring stretch 30 sec x 3   Standing marching 10 x bilateral alternating   Standing gastroc stretch 30 sec x 1   Manual- see below   Heel slides 15 x, AAROM 5 sec hold 10 x   Discussed seated heel slide         Manual Therapy   Short sitting tibiofemoral distraction with PA glide grade III followed by passive knee flexion with prolonged hold     Skilled input: verbal instruction/cues and tactile instruction/cues    Writer verbally educated and received verbal consent for hand placement, positioning of patient, and techniques to be performed today from patient     Home Exercise Program  Access Code: [email or web address]  URL: [email or web address]  Date: 03/15/2023  Prepared by: Evelia Mora    Exercises  ? Supine Single Leg Ankle Pumps - 10 x  daily - 7 x weekly - 1 sets - 10 reps  ? Supine Quadricep Sets - 3 x daily - 7 x weekly - 1 sets - 10 reps  ? Supine Straight Leg Raises - 3 x daily - 7 x weekly - 1 sets - 10 reps  ? Supine Heel Slide - 3 x daily - 7 x weekly - 1 sets - 10 reps - 3-5 seconds hold  ? Supine Knee Extension Strengthening - 3 x daily - 7 x weekly - 1 sets - 10 reps  ? Seated Long Arc Quad - 3 x daily - 7 x weekly - 1 sets - 10 reps  ? Seated Knee Flexion Extension AROM - 3 x daily - 7 x weekly - 1 sets - 10 reps - 3-5 seconds hold  ? Standing Quad Set - 1 x daily - 7 x weekly - 3 sets - 10 reps  ? Standing Knee Flexion Stretch on Step - 1 x daily - 7 x weekly - 3 sets - 10 reps  ? Seated Heel Slide - 1 x daily - 7 x weekly - 3 sets - 10 reps             ASSESSMENT                                                                                                            Patient making slow gains in ROM.  Discussed ROM goals and various ways to progress ROM throughout the day other than the CPM.  Encouraged increasing CPM as tolerated.    PLAN                                                                                                                           Suggestions for next session as indicated: Progress per plan of care, continue ROM focus        Therapy procedure time and total treatment time can be found documented on the Time Entry flowsheet     [employer] Pregnancy And Lactation Text: This medication is Pregnancy Category X and contraindicated in pregnancy and in women who may become pregnant. It is unknown if this medication is excreted in breast milk.

## 2023-03-20 ENCOUNTER — OFFICE VISIT (OUTPATIENT)
Dept: PRIMARY CARE | Facility: CLINIC | Age: 63
End: 2023-03-20
Payer: COMMERCIAL

## 2023-03-20 VITALS
BODY MASS INDEX: 26.73 KG/M2 | WEIGHT: 160.6 LBS | RESPIRATION RATE: 12 BRPM | HEART RATE: 63 BPM | SYSTOLIC BLOOD PRESSURE: 120 MMHG | DIASTOLIC BLOOD PRESSURE: 80 MMHG | OXYGEN SATURATION: 98 %

## 2023-03-20 DIAGNOSIS — Z78.0 POSTMENOPAUSAL: ICD-10-CM

## 2023-03-20 DIAGNOSIS — Z12.31 ENCOUNTER FOR SCREENING MAMMOGRAM FOR MALIGNANT NEOPLASM OF BREAST: ICD-10-CM

## 2023-03-20 DIAGNOSIS — M85.80 OSTEOPENIA, UNSPECIFIED LOCATION: ICD-10-CM

## 2023-03-20 DIAGNOSIS — Z00.00 ENCOUNTER FOR GENERAL ADULT MEDICAL EXAMINATION WITHOUT ABNORMAL FINDINGS: Primary | ICD-10-CM

## 2023-03-20 DIAGNOSIS — C44.311 BASAL CELL CARCINOMA (BCC) OF SKIN OF NOSE: ICD-10-CM

## 2023-03-20 DIAGNOSIS — Z12.11 COLON CANCER SCREENING: ICD-10-CM

## 2023-03-20 PROBLEM — R60.0 EDEMA OF LOWER EXTREMITY: Status: RESOLVED | Noted: 2019-11-04 | Resolved: 2023-03-20

## 2023-03-20 PROBLEM — S91.002A OPEN WOUND OF LEFT ANKLE: Status: RESOLVED | Noted: 2019-11-04 | Resolved: 2023-03-20

## 2023-03-20 PROCEDURE — 3008F BODY MASS INDEX DOCD: CPT | Performed by: FAMILY MEDICINE

## 2023-03-20 PROCEDURE — 99396 PREV VISIT EST AGE 40-64: CPT | Performed by: FAMILY MEDICINE

## 2023-03-20 RX ORDER — NEOMYCIN SULFATE, POLYMYXIN B SULFATE, HYDROCORTISONE 3.5; 10000; 1 MG/ML; [USP'U]/ML; MG/ML
3 SOLUTION/ DROPS AURICULAR (OTIC) 3 TIMES DAILY
Qty: 10 ML | Refills: 0 | Status: SHIPPED | OUTPATIENT
Start: 2023-03-20 | End: 2023-03-25

## 2023-03-20 NOTE — PROGRESS NOTES
Elizabeth Pleitez is a 62 y.o. female.    HPI     In office for annual       Glasses- due for eye exam.    Had mammogram recently - normal in 2/2023    Pap normal last year.     Would like to continue to do FIT    [facility]          The following have been reviewed and updated as appropriate in this visit:    Allergies   Allergen Reactions   • Penicillins Hives   • Penicillins        Current Outpatient Medications   Medication Sig Dispense Refill   • ascorbate calcium (VITAMIN C ORAL) Take by mouth.     • cholecalciferol, vitamin D3, 5,000 unit (125 mcg) capsule Take 5,000 Units by mouth daily.     • cranberry extract 425 mg capsule Take by mouth daily.     • MULTIVITAMIN ORAL Take by mouth.     • vitamin E acetate (VITAMIN E ORAL) Take by mouth.     • famciclovir (FAMVIR) 500 mg tablet Take 1 tablet (500 mg total) by mouth 3 (three) times a day for 7 days. 21 tablet 0     No current facility-administered medications for this visit.       Past Medical History:   Diagnosis Date   • Abnormal Pap smear of cervix    • Clicking tinnitus of right ear      Past Surgical History:   Procedure Laterality Date   • CERVICAL BIOPSY  W/ LOOP ELECTRODE EXCISION      age late 30s   • CERVICAL BIOPSY  W/ LOOP ELECTRODE EXCISION     • COLPOSCOPY     • MOHS SURGERY      nose     Family History   Problem Relation Age of Onset   • Diabetes Biological Father    • Lung cancer Biological Father    • Heart disease Biological Mother    • Osteoporosis Biological Mother    • Breast cancer Neg Hx    • Colon cancer Neg Hx      Social History     Tobacco Use   • Smoking status: Former   • Smokeless tobacco: Never   Substance Use Topics   • Alcohol use: No   • Drug use: No       Review of Systems   Constitutional: Negative for fever and unexpected weight change.   HENT: Negative for ear pain and hearing loss.    Eyes: Negative for visual disturbance.   Respiratory: Negative for cough and shortness of breath.    Cardiovascular: Negative  for chest pain and palpitations.   Gastrointestinal: Negative for abdominal pain, blood in stool, constipation and diarrhea.   Genitourinary: Negative for dysuria and frequency.   Musculoskeletal: Negative for arthralgias and back pain.   Neurological: Negative for light-headedness and headaches.   Psychiatric/Behavioral: Negative for dysphoric mood. The patient is not nervous/anxious.          Visit Vitals  /80   Pulse 63   Resp 12   Wt 72.8 kg (160 lb 9.6 oz)   SpO2 98%   BMI 26.73 kg/m²     BP Readings from Last 3 Encounters:   03/29/23 (!) 141/82   03/20/23 120/80   03/10/22 110/80     Wt Readings from Last 3 Encounters:   03/20/23 72.8 kg (160 lb 9.6 oz)   03/10/22 71.8 kg (158 lb 6.4 oz)   12/09/21 73.6 kg (162 lb 3.2 oz)       Physical Exam  Vitals reviewed.   Constitutional:       Appearance: She is well-developed.   HENT:      Right Ear: Tympanic membrane, ear canal and external ear normal.      Left Ear: Tympanic membrane, ear canal and external ear normal.      Mouth/Throat:      Mouth: Mucous membranes are moist.      Pharynx: Oropharynx is clear.   Eyes:      General: No scleral icterus.     Conjunctiva/sclera: Conjunctivae normal.      Pupils: Pupils are equal, round, and reactive to light.   Neck:      Thyroid: No thyroid mass or thyromegaly.      Vascular: No carotid bruit.   Cardiovascular:      Rate and Rhythm: Normal rate and regular rhythm.      Heart sounds: Normal heart sounds. No murmur heard.  Pulmonary:      Effort: Pulmonary effort is normal. No respiratory distress.      Breath sounds: Normal breath sounds.   Abdominal:      General: Bowel sounds are normal. There is no distension.      Palpations: Abdomen is soft.      Tenderness: There is no abdominal tenderness.   Musculoskeletal:      Cervical back: Normal range of motion and neck supple.   Lymphadenopathy:      Cervical: No cervical adenopathy.      Upper Body:      Right upper body: No supraclavicular adenopathy.      Left  upper body: No supraclavicular adenopathy.   Skin:     Findings: No rash.   Neurological:      Cranial Nerves: No cranial nerve deficit.   Psychiatric:         Mood and Affect: Mood normal.         Lab Results   Component Value Date    WBC 6.0 02/05/2022    HGB 14.4 02/05/2022    HCT 44.4 02/05/2022    MCV 93 02/05/2022     02/05/2022     Lab Results   Component Value Date    GLUCOSE 98 02/05/2022    CALCIUM 9.2 10/05/2019     02/05/2022    K 4.5 02/05/2022    CO2 25 02/05/2022     02/05/2022    BUN 10 02/05/2022    CREATININE 0.85 02/05/2022     Lab Results   Component Value Date    CHOL 216 (H) 02/05/2022    TRIG 89 02/05/2022    HDL 49 02/05/2022    VLDL 16 02/05/2022    LDLCALC 151 (H) 02/05/2022     No results found for: HGBA1C  Lab Results   Component Value Date    TSH 2.020 02/05/2022       Assessment/Plan   Problem List Items Addressed This Visit        Dermatologic    Carcinoma, basal cell, skin     utd derm- [facility]             Other    Encounter for general adult medical examination without abnormal findings - Primary     Fasting labs  Discussed colon cancer screening options- cscope gold standard---pt requesting FIT  utd pap  Due eye         Relevant Orders    Lipid panel    Comprehensive metabolic panel   Other Visit Diagnoses     Osteopenia, unspecified location        Relevant Orders    Vitamin D 25 hydroxy    Colon cancer screening        Relevant Orders    FIT    Encounter for screening mammogram for malignant neoplasm of breast        Relevant Orders    BI SCREENING MAMMOGRAM BILATERAL(TOMOSYNTHESIS)    Postmenopausal        Relevant Orders    DEXA BONE DENSITY

## 2023-03-27 ENCOUNTER — TELEPHONE (OUTPATIENT)
Dept: PRIMARY CARE | Facility: CLINIC | Age: 63
End: 2023-03-27
Payer: COMMERCIAL

## 2023-03-27 NOTE — TELEPHONE ENCOUNTER
Pt had diarrhea last week at time of her EPP with Dr Choi.  Having constipation now after following the Brat diet. Has lower right sided back pain. Should she now start a stool softener?

## 2023-03-28 NOTE — TELEPHONE ENCOUNTER
Spoke to patient, she verbalized understanding and will try meds as recommended. Will follow up if symptoms persist or worsen.

## 2023-03-28 NOTE — TELEPHONE ENCOUNTER
Try benefiber in AM with increased water intake. If still no success, miralax nightly. Follow-up with persistent or worsening symptoms.

## 2023-03-29 ENCOUNTER — HOSPITAL ENCOUNTER (OUTPATIENT)
Facility: CLINIC | Age: 63
Discharge: HOME | End: 2023-03-29
Attending: EMERGENCY MEDICINE
Payer: COMMERCIAL

## 2023-03-29 VITALS
OXYGEN SATURATION: 98 % | RESPIRATION RATE: 16 BRPM | SYSTOLIC BLOOD PRESSURE: 141 MMHG | TEMPERATURE: 97.4 F | DIASTOLIC BLOOD PRESSURE: 82 MMHG | HEART RATE: 81 BPM

## 2023-03-29 DIAGNOSIS — R20.2 PARESTHESIA AND PAIN OF EXTREMITY: ICD-10-CM

## 2023-03-29 DIAGNOSIS — B02.9 HERPES ZOSTER WITHOUT COMPLICATION: Primary | ICD-10-CM

## 2023-03-29 DIAGNOSIS — M79.609 PARESTHESIA AND PAIN OF EXTREMITY: ICD-10-CM

## 2023-03-29 PROCEDURE — 99213 OFFICE O/P EST LOW 20 MIN: CPT | Performed by: EMERGENCY MEDICINE

## 2023-03-29 PROCEDURE — S9083 URGENT CARE CENTER GLOBAL: HCPCS | Performed by: EMERGENCY MEDICINE

## 2023-03-29 RX ORDER — FAMCICLOVIR 500 MG/1
500 TABLET ORAL 3 TIMES DAILY
Qty: 21 TABLET | Refills: 0 | Status: SHIPPED | OUTPATIENT
Start: 2023-03-29 | End: 2023-04-05

## 2023-03-29 ASSESSMENT — ENCOUNTER SYMPTOMS
PALPITATIONS: 0
DYSURIA: 0
ARTHRALGIAS: 0
FREQUENCY: 0
HEADACHES: 0
NAUSEA: 0
ABDOMINAL PAIN: 0
MYALGIAS: 1
CONFUSION: 0
WEAKNESS: 0
FEVER: 0

## 2023-03-30 PROBLEM — Z00.00 ENCOUNTER FOR GENERAL ADULT MEDICAL EXAMINATION WITHOUT ABNORMAL FINDINGS: Status: ACTIVE | Noted: 2023-03-30

## 2023-03-30 ASSESSMENT — ENCOUNTER SYMPTOMS
NERVOUS/ANXIOUS: 0
DIARRHEA: 0
COUGH: 0
LIGHT-HEADEDNESS: 0
BACK PAIN: 0
HEADACHES: 0
DYSPHORIC MOOD: 0
FEVER: 0
PALPITATIONS: 0
CONSTIPATION: 0
UNEXPECTED WEIGHT CHANGE: 0
FREQUENCY: 0
DYSURIA: 0
ARTHRALGIAS: 0
SHORTNESS OF BREATH: 0
ABDOMINAL PAIN: 0
BLOOD IN STOOL: 0

## 2023-03-30 NOTE — DISCHARGE INSTRUCTIONS
Keep rash clean and covered.  May use soap and water and air dry    Pickup shingles medication and start ASAP  Review patient care instruction sheet  Avoid people with compromised immune systems  Avoid pregnant been and newborns  Follow-up with your PCP for recheck to discuss the pins-and-needles feelings you have in your legs  Report to the ED if you develop worsening symptoms such as fevers or confusion.

## 2023-03-30 NOTE — ED PROVIDER NOTES
History  Chief Complaint   Patient presents with   • Rash     Rash on hip and butt and pins and needles and pain down legs     Patient presents with complaints of pins-and-needles feeling along with restlessness and intermittent pains in both legs and constipation for the past week or so.  Subsequent to that she noticed a rash in the right low back, buttocks, proximal thigh area.  She has been in touch with her PCP who recommended some Benefiber for constipation.  She denies any systemic symptoms such as fever, nausea, myalgia, arthralgia, abdominal pain.          Past Medical History:   Diagnosis Date   • Abnormal Pap smear of cervix    • Clicking tinnitus of right ear        Past Surgical History:   Procedure Laterality Date   • CERVICAL BIOPSY  W/ LOOP ELECTRODE EXCISION      age late 30s   • CERVICAL BIOPSY  W/ LOOP ELECTRODE EXCISION     • COLPOSCOPY     • MOHS SURGERY      nose       Family History   Problem Relation Age of Onset   • Diabetes Biological Father    • Lung cancer Biological Father    • Heart disease Biological Mother    • Osteoporosis Biological Mother    • Breast cancer Neg Hx    • Colon cancer Neg Hx        Social History     Tobacco Use   • Smoking status: Former   • Smokeless tobacco: Never   Substance Use Topics   • Alcohol use: No   • Drug use: No       Review of Systems   Constitutional: Negative for fever.   Cardiovascular: Negative for palpitations.   Gastrointestinal: Negative for abdominal pain and nausea.   Genitourinary: Negative for dysuria and frequency.   Musculoskeletal: Positive for myalgias. Negative for arthralgias.   Skin: Positive for rash.   Allergic/Immunologic: Negative for immunocompromised state.   Neurological: Negative for weakness and headaches.   Psychiatric/Behavioral: Negative for confusion.       Physical Exam  ED Triage Vitals [03/29/23 2103]   Temp Heart Rate Resp BP SpO2   36.3 °C (97.4 °F) 81 16 (!) 141/82 98 %      Temp Source Heart Rate Source Patient  Position BP Location FiO2 (%) (Set)   Oral Monitor Sitting Left upper arm --       Physical Exam  Vitals and nursing note reviewed.   Constitutional:       General: She is not in acute distress.     Appearance: Normal appearance. She is well-developed.   HENT:      Head: Normocephalic and atraumatic.      Nose: No congestion.   Eyes:      Conjunctiva/sclera: Conjunctivae normal.   Cardiovascular:      Rate and Rhythm: Normal rate and regular rhythm.   Pulmonary:      Effort: Pulmonary effort is normal. No respiratory distress.   Abdominal:      Palpations: Abdomen is soft.      Tenderness: There is no abdominal tenderness.   Musculoskeletal:         General: No swelling or deformity. Normal range of motion.      Cervical back: Neck supple.      Right lower leg: No edema.      Left lower leg: No edema.   Skin:     General: Skin is warm and dry.      Coloration: Skin is not jaundiced.      Findings: Rash present.      Comments: Rash which is erythematous slightly tender individual and clusters along the lumbosacral dermatome involving the right superior buttocks, hip, lateral and anterior proximal thigh   Neurological:      General: No focal deficit present.      Mental Status: She is alert.      Coordination: Coordination normal.      Gait: Gait normal.   Psychiatric:         Behavior: Behavior normal.         Thought Content: Thought content normal.           Procedures  Procedures    UC Course       Medical Decision Making  Patient presents with several problems including constipation, paresthesias and pins-and-needles feeling of both legs and a new onset rash.  Vital signs stable and patient with no outward signs of systemic illness.  Patient has spoken to her PCP in the recent past of the leg symptoms and constipation and recommending that she follow-up with her PCP for these issues.  We will start Famvir tonight for shingles rash    Herpes zoster without complication: acute illness or injury     Details: Started  on Famvir  Paresthesia and pain of extremity: undiagnosed new problem with uncertain prognosis     Details: Patient to follow-up with PCP to work these issues up  Risk  Prescription drug management.                     Ernst Styles, DO  03/29/23 2122

## 2023-03-31 NOTE — ASSESSMENT & PLAN NOTE
Fasting labs  Discussed colon cancer screening options- cscope gold standard---pt requesting FIT  utd pap  Due eye

## 2023-04-26 LAB — HEMOCCULT STL QL IA: NEGATIVE

## 2023-05-02 LAB
25(OH)D3+25(OH)D2 SERPL-MCNC: 29.1 NG/ML (ref 30–100)
ALBUMIN SERPL-MCNC: 4.4 G/DL (ref 3.8–4.8)
ALBUMIN/GLOB SERPL: 2.1 {RATIO} (ref 1.2–2.2)
ALP SERPL-CCNC: 76 IU/L (ref 44–121)
ALT SERPL-CCNC: 16 IU/L (ref 0–32)
AST SERPL-CCNC: 18 IU/L (ref 0–40)
BILIRUB SERPL-MCNC: 0.3 MG/DL (ref 0–1.2)
BUN SERPL-MCNC: 18 MG/DL (ref 8–27)
BUN/CREAT SERPL: 21 (ref 12–28)
CALCIUM SERPL-MCNC: 9.6 MG/DL (ref 8.7–10.3)
CHLORIDE SERPL-SCNC: 106 MMOL/L (ref 96–106)
CHOLEST SERPL-MCNC: 225 MG/DL (ref 100–199)
CO2 SERPL-SCNC: 24 MMOL/L (ref 20–29)
CREAT SERPL-MCNC: 0.86 MG/DL (ref 0.57–1)
EGFRCR SERPLBLD CKD-EPI 2021: 75 ML/MIN/1.73
GLOBULIN SER CALC-MCNC: 2.1 G/DL (ref 1.5–4.5)
GLUCOSE SERPL-MCNC: 93 MG/DL (ref 70–99)
HDLC SERPL-MCNC: 53 MG/DL
LDLC SERPL CALC-MCNC: 154 MG/DL (ref 0–99)
POTASSIUM SERPL-SCNC: 4.3 MMOL/L (ref 3.5–5.2)
PROT SERPL-MCNC: 6.5 G/DL (ref 6–8.5)
SODIUM SERPL-SCNC: 144 MMOL/L (ref 134–144)
TRIGL SERPL-MCNC: 102 MG/DL (ref 0–149)
VLDLC SERPL CALC-MCNC: 18 MG/DL (ref 5–40)

## 2023-05-19 ENCOUNTER — TELEPHONE (OUTPATIENT)
Dept: PRIMARY CARE | Facility: CLINIC | Age: 63
End: 2023-05-19
Payer: COMMERCIAL

## 2023-05-19 RX ORDER — ERGOCALCIFEROL 1.25 MG/1
50000 CAPSULE ORAL WEEKLY
Qty: 12 CAPSULE | Refills: 0 | Status: SHIPPED | OUTPATIENT
Start: 2023-05-19 | End: 2024-05-18

## 2023-05-19 NOTE — TELEPHONE ENCOUNTER
Spoke to patient, she verbalized understanding of all results and recommendations. Will watch saturated fats in diet and take Vitamin D as recommended.

## 2023-05-19 NOTE — TELEPHONE ENCOUNTER
Please call pt with lab results. Borderline cholesterol- stable. Cut back on saturated fats in diet. Vitamin D a little low. Would like to do 12 week course of weekly high dose and then restart her vit D supplement. Normal kidney and liver function,

## 2023-06-27 ENCOUNTER — APPOINTMENT (RX ONLY)
Dept: URBAN - METROPOLITAN AREA CLINIC 28 | Facility: CLINIC | Age: 63
Setting detail: DERMATOLOGY
End: 2023-06-27

## 2023-06-27 DIAGNOSIS — D17 BENIGN LIPOMATOUS NEOPLASM: ICD-10-CM

## 2023-06-27 DIAGNOSIS — D22 MELANOCYTIC NEVI: ICD-10-CM

## 2023-06-27 DIAGNOSIS — D18.0 HEMANGIOMA: ICD-10-CM

## 2023-06-27 DIAGNOSIS — L73.8 OTHER SPECIFIED FOLLICULAR DISORDERS: ICD-10-CM

## 2023-06-27 DIAGNOSIS — L21.8 OTHER SEBORRHEIC DERMATITIS: ICD-10-CM | Status: INADEQUATELY CONTROLLED

## 2023-06-27 DIAGNOSIS — L72.0 EPIDERMAL CYST: ICD-10-CM

## 2023-06-27 DIAGNOSIS — L81.4 OTHER MELANIN HYPERPIGMENTATION: ICD-10-CM

## 2023-06-27 DIAGNOSIS — Z71.89 OTHER SPECIFIED COUNSELING: ICD-10-CM

## 2023-06-27 DIAGNOSIS — L82.1 OTHER SEBORRHEIC KERATOSIS: ICD-10-CM

## 2023-06-27 PROBLEM — D18.01 HEMANGIOMA OF SKIN AND SUBCUTANEOUS TISSUE: Status: ACTIVE | Noted: 2023-06-27

## 2023-06-27 PROBLEM — D22.72 MELANOCYTIC NEVI OF LEFT LOWER LIMB, INCLUDING HIP: Status: ACTIVE | Noted: 2023-06-27

## 2023-06-27 PROBLEM — D48.5 NEOPLASM OF UNCERTAIN BEHAVIOR OF SKIN: Status: ACTIVE | Noted: 2023-06-27

## 2023-06-27 PROBLEM — D22.71 MELANOCYTIC NEVI OF RIGHT LOWER LIMB, INCLUDING HIP: Status: ACTIVE | Noted: 2023-06-27

## 2023-06-27 PROBLEM — D22.62 MELANOCYTIC NEVI OF LEFT UPPER LIMB, INCLUDING SHOULDER: Status: ACTIVE | Noted: 2023-06-27

## 2023-06-27 PROBLEM — D22.5 MELANOCYTIC NEVI OF TRUNK: Status: ACTIVE | Noted: 2023-06-27

## 2023-06-27 PROBLEM — D22.61 MELANOCYTIC NEVI OF RIGHT UPPER LIMB, INCLUDING SHOULDER: Status: ACTIVE | Noted: 2023-06-27

## 2023-06-27 PROBLEM — D23.61 OTHER BENIGN NEOPLASM OF SKIN OF RIGHT UPPER LIMB, INCLUDING SHOULDER: Status: ACTIVE | Noted: 2023-06-27

## 2023-06-27 PROCEDURE — ? BENIGN DESTRUCTION

## 2023-06-27 PROCEDURE — ? RECOMMENDATIONS

## 2023-06-27 PROCEDURE — 99214 OFFICE O/P EST MOD 30 MIN: CPT | Mod: 25

## 2023-06-27 PROCEDURE — ? SUNSCREEN RECOMMENDATIONS

## 2023-06-27 PROCEDURE — ? COUNSELING

## 2023-06-27 PROCEDURE — ? PRESCRIPTION MEDICATION MANAGEMENT

## 2023-06-27 PROCEDURE — 17110 DESTRUCTION B9 LES UP TO 14: CPT

## 2023-06-27 PROCEDURE — ? FULL BODY SKIN EXAM

## 2023-06-27 ASSESSMENT — LOCATION SIMPLE DESCRIPTION DERM
LOCATION SIMPLE: UPPER BACK
LOCATION SIMPLE: RIGHT FOREARM
LOCATION SIMPLE: LEFT UPPER ARM
LOCATION SIMPLE: LEFT SHOULDER
LOCATION SIMPLE: ABDOMEN
LOCATION SIMPLE: RIGHT UPPER BACK
LOCATION SIMPLE: RIGHT THIGH
LOCATION SIMPLE: LEFT UPPER BACK
LOCATION SIMPLE: RIGHT CHEEK
LOCATION SIMPLE: RIGHT UPPER ARM
LOCATION SIMPLE: SCALP
LOCATION SIMPLE: LOWER BACK
LOCATION SIMPLE: LEFT THIGH
LOCATION SIMPLE: RIGHT SHOULDER
LOCATION SIMPLE: CHEST

## 2023-06-27 ASSESSMENT — LOCATION DETAILED DESCRIPTION DERM
LOCATION DETAILED: RIGHT VENTRAL DISTAL FOREARM
LOCATION DETAILED: RIGHT MEDIAL SUPERIOR CHEST
LOCATION DETAILED: SUBXIPHOID
LOCATION DETAILED: LEFT POSTERIOR SHOULDER
LOCATION DETAILED: LEFT ANTERIOR DISTAL THIGH
LOCATION DETAILED: LEFT MEDIAL UPPER BACK
LOCATION DETAILED: LEFT SUPERIOR MEDIAL UPPER BACK
LOCATION DETAILED: RIGHT POSTERIOR SHOULDER
LOCATION DETAILED: INFERIOR THORACIC SPINE
LOCATION DETAILED: RIGHT ANTERIOR PROXIMAL UPPER ARM
LOCATION DETAILED: RIGHT MEDIAL MALAR CHEEK
LOCATION DETAILED: RIGHT ANTERIOR DISTAL THIGH
LOCATION DETAILED: RIGHT SUPERIOR MEDIAL UPPER BACK
LOCATION DETAILED: RIGHT CENTRAL MALAR CHEEK
LOCATION DETAILED: SUPERIOR LUMBAR SPINE
LOCATION DETAILED: RIGHT ANTERIOR DISTAL UPPER ARM
LOCATION DETAILED: EPIGASTRIC SKIN
LOCATION DETAILED: RIGHT INFERIOR FRONTAL SCALP
LOCATION DETAILED: LEFT ANTERIOR PROXIMAL UPPER ARM
LOCATION DETAILED: LEFT ANTERIOR PROXIMAL THIGH

## 2023-06-27 ASSESSMENT — LOCATION ZONE DERM
LOCATION ZONE: TRUNK
LOCATION ZONE: ARM
LOCATION ZONE: FACE
LOCATION ZONE: SCALP
LOCATION ZONE: LEG

## 2023-06-27 NOTE — PROCEDURE: BENIGN DESTRUCTION
Bill Insurance (You Assume Risk Of Denial Or Audit By Selecting Yes): Yes
Detail Level: Detailed
Post-Care Instructions: I reviewed with the patient in detail post-care instructions. Patient is to wear sunprotection, and avoid picking at any of the treated lesions. Pt may apply Vaseline to crusted or scabbing areas.
Render Post-Care Instructions In Note?: no
Anesthesia Volume In Cc: 0
Medical Necessity Clause: This procedure was medically necessary because the lesions that were treated were:
Consent: The patient's consent was obtained including but not limited to risks of crusting, scabbing, blistering, scarring, darker or lighter pigmentary change, recurrence, incomplete removal and infection.
Medical Necessity Information: It is in your best interest to select a reason for this procedure from the list below. All of these items fulfill various CMS LCD requirements except the new and changing color options.
Total Number Of Lesions Treated: 3
Detail Level: Zone

## 2023-06-27 NOTE — PROCEDURE: RECOMMENDATIONS
Detail Level: Zone
Recommendations (Free Text): have PCP order US to confirm dx
Render Risk Assessment In Note?: no
Recommendation Preamble: The following recommendations were made during the visit:

## 2023-06-27 NOTE — PROCEDURE: PRESCRIPTION MEDICATION MANAGEMENT
Continue Regimen: triamcinolone acetonide 0.1 % lotion: Apply a few scattered drop bid to AA of scalp until clear then D/C and restart PRN flare up
Detail Level: Zone
Render In Strict Bullet Format?: No

## 2024-01-24 NOTE — ASSESSMENT & PLAN NOTE
"    Endocrinology Inpatient Consult Progress Note     PATIENT NAME: Tawanda Lomeli  MRN: [id number]  DATE: 1/24/2024    CONSULTING PHYSICIAN: Dr. KHLOE Falcon   REASON FOR CONSULT: Uncontrolled T2DM.        Interval Events     No overnight events.  No acute complaints.   He is asking if he should continue dulaglutide as an outpatient.  Patient eager to be discharged home today.       Physical Examination     /80 (BP Location: Left arm, Patient Position: Sitting)   Pulse 103   Temp 36.2 °C (97.2 °F) (Temporal)   Resp 19   Ht 1.702 m (5' 7.01\")   Wt (!) 155 kg (342 lb 6 oz)   SpO2 92%   BMI 53.61 kg/m²   No acute distress.  Acanthosis nigricans on the neck  Regular rate and rhythm.  Nonlabored respiration.  Soft nontender nondistended abdomen.  No pedal edema bilaterally.  No rashes.      Medications     Reviewed MAR       Data     Recent Labs and Imaging Reviewed      Assessment / Plan        # Uncontrolled Type 2 Diabetes Mellitus  Home Regimen: Metformin 1 g BID.  Dulaglutide 3 mg qWK. U500 36-36-36.  Hemoglobin A1c (10/24): 6.3%  Nutrition: CHO controlled     Sugars have been at goal.   Has required minimal sliding scale coverage.   Can resume home regimen as previously upon discharge.     While inpatient:  -Cont Glargine 10 units nightly  -Cont Lispro SS    # Acute Hypercarbic Resp Fx: Likely secondary to RSV infection. Weaning off supplemental oxygen comfortably. Not requiring steroids.      # Hypertension: On lisinopril 20 mg daily at home.  This has been held as an inpatient.     # Dyslipidemia: Reviewed his lipid panel on admission.  His LDL cholesterol as well as triglycerides are at goal.  The patient is maintained on atorvastatin 40 mg as well as fenofibrate 160 mg.  I reviewed his endocrinology note.     # NAFLD: Agree with use of a GLP-1 receptor analog.  Consider addition of pioglitazone.  The patient does have many signs and symptoms of insulin resistance on exam.  Pioglitazone has also been shown " PLAN:  Tubigrip D is preferred method of compression for lower extremities  Maintain leg elevation above level of the heart as much as possible.  Restrict fluid intake to < 66oz/24 hours  Restrict salt intake to no more than 2995-3607 mgs/sodium/day  Minimize prolonged sitting and standing  Remain active to point of physical tolerance   to decrease histological findings of NAFLD in patients with T2DM.     # Obesity: Consider 1 mg overnight dexamethasone suppression test to rule out Cushing's as an outpatient.       Archie Vivar DO   Internal Medicine PGY-3     _________________________  STAFF TEACHING ADDENDUM  I have reviewed the progress note obtained and documented by Dr. Vivar and I personally participated in the key components. I have discussed the case and management of the patient's care with Dr. Vivar. I have edited the note as needed.    Jose R Falcon, DO  Endocrinology, Diabetes, and Metabolism  1/24/2024 10:18 AM    Please excuse and typographical or unwanted errors within this documentation as voice recognition software was used to dictate this note.   __________________________

## 2024-02-21 ENCOUNTER — HOSPITAL ENCOUNTER (OUTPATIENT)
Dept: RADIOLOGY | Facility: HOSPITAL | Age: 64
Discharge: HOME | End: 2024-02-21
Attending: FAMILY MEDICINE
Payer: MEDICARE

## 2024-02-21 DIAGNOSIS — Z78.0 POSTMENOPAUSAL: ICD-10-CM

## 2024-02-21 PROCEDURE — 77080 DXA BONE DENSITY AXIAL: CPT

## 2024-03-01 ENCOUNTER — TELEPHONE (OUTPATIENT)
Dept: PRIMARY CARE | Facility: CLINIC | Age: 64
End: 2024-03-01
Payer: MEDICARE

## 2024-03-01 NOTE — TELEPHONE ENCOUNTER
----- Message from Harika Choi MD sent at 2/29/2024 11:49 PM EST -----  Please call pt. Please let the pt know that her dexa scan still shows osteopenia ( mildly weaker bones)- slightly worse in the lower spine No prescription medication needed for this. Recommended 1200mg calcium (primarily through diet- dairy, leafy greens, broccoli, almonds) and atleast 400 IU Vit D. Weight bearing exercises like walking and jogging are great for your bones as well.

## 2024-06-25 ENCOUNTER — APPOINTMENT (RX ONLY)
Dept: URBAN - METROPOLITAN AREA CLINIC 28 | Facility: CLINIC | Age: 64
Setting detail: DERMATOLOGY
End: 2024-06-25

## 2024-06-25 DIAGNOSIS — L81.4 OTHER MELANIN HYPERPIGMENTATION: ICD-10-CM

## 2024-06-25 DIAGNOSIS — D22 MELANOCYTIC NEVI: ICD-10-CM

## 2024-06-25 DIAGNOSIS — L72.0 EPIDERMAL CYST: ICD-10-CM

## 2024-06-25 DIAGNOSIS — L82.1 OTHER SEBORRHEIC KERATOSIS: ICD-10-CM

## 2024-06-25 DIAGNOSIS — D18.0 HEMANGIOMA: ICD-10-CM

## 2024-06-25 DIAGNOSIS — D485 NEOPLASM OF UNCERTAIN BEHAVIOR OF SKIN: ICD-10-CM

## 2024-06-25 DIAGNOSIS — Z71.89 OTHER SPECIFIED COUNSELING: ICD-10-CM

## 2024-06-25 PROBLEM — D22.5 MELANOCYTIC NEVI OF TRUNK: Status: ACTIVE | Noted: 2024-06-25

## 2024-06-25 PROBLEM — D48.5 NEOPLASM OF UNCERTAIN BEHAVIOR OF SKIN: Status: ACTIVE | Noted: 2024-06-25

## 2024-06-25 PROBLEM — D22.71 MELANOCYTIC NEVI OF RIGHT LOWER LIMB, INCLUDING HIP: Status: ACTIVE | Noted: 2024-06-25

## 2024-06-25 PROBLEM — D22.62 MELANOCYTIC NEVI OF LEFT UPPER LIMB, INCLUDING SHOULDER: Status: ACTIVE | Noted: 2024-06-25

## 2024-06-25 PROBLEM — D22.72 MELANOCYTIC NEVI OF LEFT LOWER LIMB, INCLUDING HIP: Status: ACTIVE | Noted: 2024-06-25

## 2024-06-25 PROBLEM — D18.01 HEMANGIOMA OF SKIN AND SUBCUTANEOUS TISSUE: Status: ACTIVE | Noted: 2024-06-25

## 2024-06-25 PROBLEM — D22.61 MELANOCYTIC NEVI OF RIGHT UPPER LIMB, INCLUDING SHOULDER: Status: ACTIVE | Noted: 2024-06-25

## 2024-06-25 PROCEDURE — ? FULL BODY SKIN EXAM

## 2024-06-25 PROCEDURE — ? PHOTO-DOCUMENTATION

## 2024-06-25 PROCEDURE — ? COUNSELING

## 2024-06-25 PROCEDURE — 99213 OFFICE O/P EST LOW 20 MIN: CPT | Mod: 25

## 2024-06-25 PROCEDURE — 11102 TANGNTL BX SKIN SINGLE LES: CPT

## 2024-06-25 PROCEDURE — ? DEFER

## 2024-06-25 PROCEDURE — ? BIOPSY BY SHAVE METHOD

## 2024-06-25 PROCEDURE — ? SUNSCREEN RECOMMENDATIONS

## 2024-06-25 ASSESSMENT — LOCATION SIMPLE DESCRIPTION DERM
LOCATION SIMPLE: RIGHT UPPER ARM
LOCATION SIMPLE: RIGHT UPPER BACK
LOCATION SIMPLE: RIGHT SHOULDER
LOCATION SIMPLE: UPPER BACK
LOCATION SIMPLE: RIGHT THIGH
LOCATION SIMPLE: LEFT UPPER ARM
LOCATION SIMPLE: CHEST
LOCATION SIMPLE: ABDOMEN
LOCATION SIMPLE: RIGHT CHEEK
LOCATION SIMPLE: LOWER BACK
LOCATION SIMPLE: LEFT UPPER BACK
LOCATION SIMPLE: LEFT THIGH
LOCATION SIMPLE: LEFT SHOULDER

## 2024-06-25 ASSESSMENT — LOCATION ZONE DERM
LOCATION ZONE: LEG
LOCATION ZONE: ARM
LOCATION ZONE: FACE
LOCATION ZONE: TRUNK

## 2024-06-25 ASSESSMENT — LOCATION DETAILED DESCRIPTION DERM
LOCATION DETAILED: SUPERIOR LUMBAR SPINE
LOCATION DETAILED: RIGHT ANTERIOR DISTAL THIGH
LOCATION DETAILED: RIGHT MEDIAL MALAR CHEEK
LOCATION DETAILED: RIGHT ANTERIOR DISTAL UPPER ARM
LOCATION DETAILED: RIGHT ANTERIOR PROXIMAL UPPER ARM
LOCATION DETAILED: RIGHT SUPERIOR MEDIAL UPPER BACK
LOCATION DETAILED: EPIGASTRIC SKIN
LOCATION DETAILED: RIGHT MEDIAL SUPERIOR CHEST
LOCATION DETAILED: INFERIOR THORACIC SPINE
LOCATION DETAILED: LEFT SUPERIOR MEDIAL UPPER BACK
LOCATION DETAILED: LEFT ANTERIOR DISTAL THIGH
LOCATION DETAILED: LEFT POSTERIOR SHOULDER
LOCATION DETAILED: RIGHT POSTERIOR SHOULDER
LOCATION DETAILED: LEFT ANTERIOR PROXIMAL THIGH
LOCATION DETAILED: LEFT ANTERIOR PROXIMAL UPPER ARM

## 2024-06-25 NOTE — PROCEDURE: DEFER
Detail Level: Simple
Size Of Lesion In Cm (Optional): 0
Procedure To Be Performed At Next Visit: Electrodesiccation (Cosmetic)
Instructions (Optional): Patient was quoted $149.00
Introduction Text (Please End With A Colon): The following procedure was deferred:

## 2024-06-25 NOTE — PROCEDURE: MIPS QUALITY
Quality 226: Preventive Care And Screening: Tobacco Use: Screening And Cessation Intervention: Tobacco Screening not Performed
Detail Level: Detailed
Quality 431: Preventive Care And Screening: Unhealthy Alcohol Use - Screening: Patient not identified as an unhealthy alcohol user when screened for unhealthy alcohol use using a systematic screening method
Quality 130: Documentation Of Current Medications In The Medical Record: Current Medications with Name, Dosage, Frequency, or Route not Documented, Reason not Given

## 2024-09-03 SDOH — ECONOMIC STABILITY: FOOD INSECURITY: WITHIN THE PAST 12 MONTHS, YOU WORRIED THAT YOUR FOOD WOULD RUN OUT BEFORE YOU GOT MONEY TO BUY MORE.: NEVER TRUE

## 2024-09-03 SDOH — ECONOMIC STABILITY: FOOD INSECURITY: WITHIN THE PAST 12 MONTHS, THE FOOD YOU BOUGHT JUST DIDN'T LAST AND YOU DIDN'T HAVE MONEY TO GET MORE.: NEVER TRUE

## 2024-09-03 SDOH — ECONOMIC STABILITY: TRANSPORTATION INSECURITY
IN THE PAST 12 MONTHS, HAS LACK OF TRANSPORTATION KEPT YOU FROM MEETINGS, WORK, OR FROM GETTING THINGS NEEDED FOR DAILY LIVING?: NO

## 2024-09-03 SDOH — ECONOMIC STABILITY: TRANSPORTATION INSECURITY
IN THE PAST 12 MONTHS, HAS THE LACK OF TRANSPORTATION KEPT YOU FROM MEDICAL APPOINTMENTS OR FROM GETTING MEDICATIONS?: NO

## 2024-09-03 SDOH — ECONOMIC STABILITY: INCOME INSECURITY: IN THE LAST 12 MONTHS, WAS THERE A TIME WHEN YOU WERE NOT ABLE TO PAY THE MORTGAGE OR RENT ON TIME?: NO

## 2024-09-03 ASSESSMENT — SOCIAL DETERMINANTS OF HEALTH (SDOH): IN THE PAST 12 MONTHS, HAS THE ELECTRIC, GAS, OIL, OR WATER COMPANY THREATENED TO SHUT OFF SERVICE IN YOUR HOME?: NO

## 2024-09-03 NOTE — PROGRESS NOTES
Subjective     Elizabeth Pleitez is a 64 y.o. female who presents for a subsequent annual wellness visit.     Patient Care Team:  #[id number], [facility] Provider as PCP - Karen Marques DO as Surgeon (Obstetrics and Gynecology)  Harika Choi MD (Family Medicine)    UTD on pap, dexa, tdap.   Due for FIT test and mammogram    UTD with dentist and derm. Will see ophal- has cataracts bilaterally     Joined the [facility]     Comprehensive Medical and Social History  Patient Active Problem List   Diagnosis    Carcinoma, basal cell, skin    Encounter for Medicare annual wellness exam    Colon cancer screening    Encounter for screening mammogram for malignant neoplasm of breast    Vitamin D deficiency    Osteopenia     Past Medical History:   Diagnosis Date    Abnormal Pap smear of cervix     Clicking tinnitus of right ear      Past Surgical History:   Procedure Laterality Date    CERVICAL BIOPSY  W/ LOOP ELECTRODE EXCISION      age late 30s    CERVICAL BIOPSY  W/ LOOP ELECTRODE EXCISION      COLPOSCOPY      MOHS SURGERY      nose     Allergies   Allergen Reactions    Penicillins Hives    Penicillins      Current Outpatient Medications   Medication Sig Dispense Refill    ascorbate calcium (VITAMIN C ORAL) Take by mouth.      cholecalciferol, vitamin D3, 5,000 unit (125 mcg) capsule Take 5,000 Units by mouth daily.      cranberry extract 425 mg capsule Take by mouth daily.      ergocalciferol (DrisdoL) 50,000 unit(1250 mcg) capsule Take 1 capsule (50,000 Units total) by mouth once a week. 12 capsule 0    famciclovir (FAMVIR) 500 mg tablet Take 1 tablet (500 mg total) by mouth 3 (three) times a day for 7 days. 21 tablet 0    MULTIVITAMIN ORAL Take by mouth.      vitamin E acetate (VITAMIN E ORAL) Take by mouth.       No current facility-administered medications for this visit.     Social History     Tobacco Use    Smoking status: Former    Smokeless tobacco: Never   Substance Use Topics    Alcohol use:  "No    Drug use: No     Family History   Problem Relation Age of Onset    Diabetes Biological Father     Lung cancer Biological Father     Heart disease Biological Mother     Osteoporosis Biological Mother     Breast cancer Neg Hx     Colon cancer Neg Hx        Objective   Vitals  Vitals:    09/09/24 0741   BP: 120/80   BP Location: Left upper arm   Patient Position: Sitting   Pulse: (!) 58   Resp: 14   Temp: 36.7 °C (98 °F)   TempSrc: Temporal   SpO2: 97%   Weight: 75.8 kg (167 lb)   Height: 1.638 m (5' 4.5\")     Body mass index is 28.22 kg/m².    Advanced Care Plan  Does patient have advance directive?: No                                     PHQ  Will the patient answer the depression questions?: Yes   Little interest or pleasure in doing things: Not at all   Feeling down, depressed, or hopeless: Not at all   Depression Risk: 0                                             Mini Cog  Completed: Yes  Score: 5  Result: Negative      Get Up and Go  Result: Pass    STEADI Falls Risk  One or more falls in the last year: No           Has trouble stepping up onto a curb: No   Advised to use a cane or walker to get around safely: No   Often has to rush to the toilet: No   Feels unsteady when walking: No   Has lost some feeling in feet: No   Often feels sad or depressed: No   Steadies self on furniture while walking at home: No   Takes medication that makes him/her feel lightheaded or more tired than usual: No   Worried about falling: No   Takes medicine to sleep or improve mood: No   Needs to push with hands when rising from a chair: No   Falls screen completed: Yes     Hearing and Vision Screening  No results found.  See HRA for relevant hearing screening response.    Diet and Exercise   Trying to stay well balanced, recently joined the [place]         Assessment/Plan   Diagnoses and all orders for this visit:    Encounter for Medicare annual wellness exam (Primary)  Assessment & Plan:  64-year-old female here for JACINTA, no " concerns  Reviewed past medical history, past surgical history, allergies, medications  She is due for mammogram and fit test  Finished with Paps  Follows with Derm, dentist, ophthalmology  Has bilateral cataracts however is not currently interested in pursuing surgery  Recently joined the [facility]  Encouraged balanced diet  Basic lab work ordered today, will call with results  Flu shot in office today.  She will get COVID-vaccine at pharmacy.  Considering getting pneumonia vaccine  Up-to-date with [employer]  Follow-up in 1 year or sooner as needed    Orders:  -     Comprehensive metabolic panel; Future  -     Lipid panel; Future  -     Vitamin D 25 hydroxy; Future    Colon cancer screening  -     FIT Test; Future    Encounter for screening mammogram for malignant neoplasm of breast  -     BI SCREENING MAMMOGRAM BILATERAL(TOMOSYNTHESIS); Future    Vitamin D deficiency  -     Vitamin D 25 hydroxy; Future    Osteopenia, unspecified location  Assessment & Plan:  Seen on dexa 2024. Encouraged weight bearing exercise  F/u vit d level       Other orders  -     Influenza vaccine high dose trivalent 65 and older IM (Fluzone High Dose)  -     Influenza vaccine 65 and older IM preservative free (FluAd)        See Patient Instructions (the written plan) which was given to the patient for PPPS and health risk factors with interventions.

## 2024-09-09 ENCOUNTER — OFFICE VISIT (OUTPATIENT)
Dept: PRIMARY CARE | Facility: CLINIC | Age: 64
End: 2024-09-09
Payer: MEDICARE

## 2024-09-09 VITALS
WEIGHT: 167 LBS | DIASTOLIC BLOOD PRESSURE: 80 MMHG | HEART RATE: 58 BPM | HEIGHT: 65 IN | OXYGEN SATURATION: 97 % | TEMPERATURE: 98 F | SYSTOLIC BLOOD PRESSURE: 120 MMHG | RESPIRATION RATE: 14 BRPM | BODY MASS INDEX: 27.82 KG/M2

## 2024-09-09 DIAGNOSIS — Z12.31 ENCOUNTER FOR SCREENING MAMMOGRAM FOR MALIGNANT NEOPLASM OF BREAST: ICD-10-CM

## 2024-09-09 DIAGNOSIS — Z00.00 ENCOUNTER FOR MEDICARE ANNUAL WELLNESS EXAM: Primary | ICD-10-CM

## 2024-09-09 DIAGNOSIS — E55.9 VITAMIN D DEFICIENCY: ICD-10-CM

## 2024-09-09 DIAGNOSIS — M85.80 OSTEOPENIA, UNSPECIFIED LOCATION: ICD-10-CM

## 2024-09-09 DIAGNOSIS — Z12.11 COLON CANCER SCREENING: ICD-10-CM

## 2024-09-09 PROCEDURE — G0008 ADMIN INFLUENZA VIRUS VAC: HCPCS

## 2024-09-09 PROCEDURE — 90662 IIV NO PRSV INCREASED AG IM: CPT

## 2024-09-09 PROCEDURE — G0439 PPPS, SUBSEQ VISIT: HCPCS

## 2024-09-09 ASSESSMENT — MINI COG
TOTAL SCORE: 5
COMPLETED: YES

## 2024-09-09 ASSESSMENT — PATIENT HEALTH QUESTIONNAIRE - PHQ9: SUM OF ALL RESPONSES TO PHQ9 QUESTIONS 1 & 2: 0

## 2024-09-09 NOTE — ASSESSMENT & PLAN NOTE
64-year-old female here for JACINTA, no concerns  Reviewed past medical history, past surgical history, allergies, medications  She is due for mammogram and fit test  Finished with Paps  Follows with Derm, dentist, ophthalmology  Has bilateral cataracts however is not currently interested in pursuing surgery  Recently joined the [facility]  Encouraged balanced diet  Basic lab work ordered today, will call with results  Flu shot in office today.  She will get COVID-vaccine at pharmacy.  Considering getting pneumonia vaccine  Up-to-date with [employer]  Follow-up in 1 year or sooner as needed

## 2024-09-09 NOTE — PATIENT INSTRUCTIONS
Your Personalized Prevention Plan Services (PPPS)    Preventive Services Checklist (Assumes Average Risk Unless Otherwise Noted):    Health Maintenance Topics with due status: Overdue       Topic Date Due    RSV (60+ years old [shared decision making] or in pregnancy during 32 through 36 weeks) Never done    Pneumococcal (65 years and older) Never done    Breast Cancer Screening 02/25/2024    Colorectal Cancer Screening 04/22/2024    Influenza Vaccine 08/01/2024    COVID-19 Vaccine 09/01/2024     Health Maintenance Topics with due status: Not Due       Topic Last Completion Date    DTaP, Tdap, and Td Vaccines 09/28/2019    DEXA Scan 02/21/2024    Depression Screening 09/09/2024    Falls Risk Screening 09/09/2024     Health Maintenance Topics with due status: Completed       Topic Last Completion Date    Hepatitis C Screening 12/22/2018    Zoster Vaccine 03/10/2022     Health Maintenance Topics with due status: Aged Out       Topic Date Due    Meningococcal ACWY Aged Out    RSV <20 months Aged Out    HIB Vaccines Aged Out    Hepatitis B Vaccines Aged Out    IPV Vaccines Aged Out    HPV Vaccines Aged Out       You May Be Eligible for These Additional Preventive Services   (Assumes Average Risk Unless Otherwise Noted)  Diabetes Screening Any 1 risk factor: hypertension, dyslipidemia, obesity, high glucose; or Any 2 risk factors: >=64yo, overweight, family history diabetes (covered every 6 months)   Hepatitis C Screening Any 1 risk factor: 1) blood transfusion before 1992,   2) current or past injection drug use (annually for high risk; if born between 8171-6990, see above for status).   Vaccine: Hepatitis B As necessary if at-risk: hemophilia, ESRD, diabetes, living with individual infected with hep B, healthcare worker with frequent contact with blood/bodily fluids (series covered once)   Sexually Transmitted Diseases (STDs) As necessary chlamydia, gonorrhea, syphilis, hepatitis B (covered  annually)  HIV if any 1 risk factor present: 1) <14yo or >66yo and at increased risk or 2) 15-66yo and ask for it (covered annually)   Lung Cancer Screening Low dose chest CT if all three risk factors: 1) 50-76yo, 2) smoker or quit within last 15y, 3) >=20 pack years (covered annually).  No results found for this or any previous visit.       Cholesterol Screening Both risk factors: 1) >=21yo and 2)  increased risk coronary artery disease (covered every 5 years).   Breast Cancer Screening Covered once 35-40yo, annually >=41yo (if >=51yo, see above for status).       Health Risk Factors with Personalized Education:  ----------------------------------------------------------------------------------------------------------------------  Controlling Your Cholesterol  Reduce the amount of saturated and trans fat in your diet.  Limit intake of red meat.  Consume only low-fat or non-fat/skim dairy.  Limit fried food.  Cook with vegetable oils.  Reduce your intake of sugary foods, sugary drinks and alcohol.  Eat a diet high in fruit, vegetables and whole grains.  Get protein from fish, poultry and a small portion of nuts.  Stay active.  Try to get at least 90 to 150 minutes of exercise per week.  Try brisk walking, swimming, bicycling or dancing.  Maintain a healthy weight by balancing your diet and exercise.  If you have been prescribed medication, take it regularly and exactly as prescribed. Let your PCP know if you have any problems or questions about your medication.  It’s important to know your cholesterol numbers.  When recommended by your PCP, get the cholesterol blood test.  ----------------------------------------------------------------------------------------------------------------------  Controlling Your Osteopenia, Strengthening Your Bones  Try to get at least 90 to 150 minutes of weight-bearing exercise per week.  Ensure intake of at least 1200mg of calcium per day.  Eat foods high in calcium like milk and  other dairy, green vegetables, fruit, canned fish with soft and edible bones, nuts, calcium-set tofu.  Some foods are calcium-fortified, like bread, cereal, fruit juices and mineral water.  Help your body make vitamin D by getting 10-15 minutes per day of sunlight.    Ensure intake of at least 600IU of vitamin D per day.  Eat foods high in vitamin D like oily fish (salmon, sardines, mackerel) and eggs.  Some foods are fortified with vitamin D, like dairy and cereals.  Avoid high amounts of caffeine and salt, since they can cause the body to loose calcium.  Limit alcohol intake, since it is associated with weaker bones and is associated with falls and fractures.  Limit intake of fizzy drinks.  If you have been prescribed medication, take it regularly and exactly as prescribed.  Let your PCP know if you have any problems or questions about your medication  ----------------------------------------------------------------------------------------------------------------------  Reducing Your Risk of Falls  Tell your PCP if any of your medications make you feel tired, dizzy, lightheaded or off-balance.  Maintain coordination, flexibility and balance by ensuring regular physical activity.  Limit alcohol intake to 1 drink per day.  Consider avoiding all alcohol intake.  Ensure good vision.  Visit an ophthalmologist or optometrist regularly for vision screening or to make sure your glasses / contact lens prescription is correct.  If you need glasses or contacts, wear them.  When you get new glasses or contacts, take time to get used to them.  Do not wear sunglasses or tinted lenses when indoors.  Ensure good hearing.  Have your hearing checked if you are having trouble hearing, or family and friends think you cannot hear them.  If you need a hearing aid, be sure it fits well and wear it.  Get enough rest.  Ensure about 7-9 hours of sleep every day.  Get up slowly from your bed or chairs.  Do not start walking until you are  sure you feel steady.  Wear non-skid, rubber-soled, low-heeled shoes.  Do not walk in socks, or in shoes and slippers with smooth soles.  If your PCP or therapist recommends using a cane or walker, use it regularly.  Make your home safer.  Increase lighting throughout the house, especially at the top and bottom of stairs.  Ensure lighting is easily turned on when getting up in the middle of the night.  Make sure there are two secure rails on all stairs.  Install grab bars in the bathtub / shower and near the toilet.  Consider using a shower chair and / or a hand-held shower.  Spread sand or salt on icy surfaces.  Beware of wet surfaces, which can be icy.  Tell your PCP if you have fallen.

## 2024-09-11 LAB
ALBUMIN SERPL-MCNC: 4.5 G/DL (ref 3.9–4.9)
ALP SERPL-CCNC: 82 IU/L (ref 44–121)
ALT SERPL-CCNC: 20 IU/L (ref 0–32)
AST SERPL-CCNC: 23 IU/L (ref 0–40)
BILIRUB SERPL-MCNC: 0.3 MG/DL (ref 0–1.2)
BUN SERPL-MCNC: 15 MG/DL (ref 8–27)
BUN/CREAT SERPL: 17 (ref 12–28)
CALCIUM SERPL-MCNC: 9.6 MG/DL (ref 8.7–10.3)
CHLORIDE SERPL-SCNC: 104 MMOL/L (ref 96–106)
CHOLEST SERPL-MCNC: 235 MG/DL (ref 100–199)
CO2 SERPL-SCNC: 25 MMOL/L (ref 20–29)
CREAT SERPL-MCNC: 0.87 MG/DL (ref 0.57–1)
EGFRCR SERPLBLD CKD-EPI 2021: 73 ML/MIN/1.73
GLOBULIN SER CALC-MCNC: 2.1 G/DL (ref 1.5–4.5)
GLUCOSE SERPL-MCNC: 91 MG/DL (ref 70–99)
HDLC SERPL-MCNC: 59 MG/DL
LDLC SERPL CALC-MCNC: 161 MG/DL (ref 0–99)
POTASSIUM SERPL-SCNC: 4.7 MMOL/L (ref 3.5–5.2)
PROT SERPL-MCNC: 6.6 G/DL (ref 6–8.5)
SODIUM SERPL-SCNC: 142 MMOL/L (ref 134–144)
TRIGL SERPL-MCNC: 86 MG/DL (ref 0–149)
VLDLC SERPL CALC-MCNC: 15 MG/DL (ref 5–40)

## 2024-09-12 LAB — 25(OH)D3+25(OH)D2 SERPL-MCNC: 50 NG/ML (ref 30–100)

## 2024-09-13 LAB — HEMOCCULT STL QL IA: NEGATIVE

## 2024-10-26 ENCOUNTER — HOSPITAL ENCOUNTER (OUTPATIENT)
Dept: RADIOLOGY | Facility: HOSPITAL | Age: 64
Discharge: HOME | End: 2024-10-26
Payer: MEDICARE

## 2024-10-26 DIAGNOSIS — Z12.31 ENCOUNTER FOR SCREENING MAMMOGRAM FOR MALIGNANT NEOPLASM OF BREAST: ICD-10-CM

## 2024-10-26 PROCEDURE — 77067 SCR MAMMO BI INCL CAD: CPT

## 2025-06-11 ENCOUNTER — APPOINTMENT (OUTPATIENT)
Dept: URBAN - METROPOLITAN AREA CLINIC 374 | Facility: CLINIC | Age: 65
Setting detail: DERMATOLOGY
End: 2025-06-11

## 2025-06-11 DIAGNOSIS — L92.0 GRANULOMA ANNULARE: ICD-10-CM | Status: INADEQUATELY CONTROLLED

## 2025-06-11 DIAGNOSIS — D18.0 HEMANGIOMA: ICD-10-CM

## 2025-06-11 DIAGNOSIS — D22 MELANOCYTIC NEVI: ICD-10-CM

## 2025-06-11 DIAGNOSIS — L81.4 OTHER MELANIN HYPERPIGMENTATION: ICD-10-CM

## 2025-06-11 DIAGNOSIS — L82.1 OTHER SEBORRHEIC KERATOSIS: ICD-10-CM

## 2025-06-11 DIAGNOSIS — Z85.828 PERSONAL HISTORY OF OTHER MALIGNANT NEOPLASM OF SKIN: ICD-10-CM

## 2025-06-11 DIAGNOSIS — Z71.89 OTHER SPECIFIED COUNSELING: ICD-10-CM

## 2025-06-11 PROBLEM — D22.5 MELANOCYTIC NEVI OF TRUNK: Status: ACTIVE | Noted: 2025-06-11

## 2025-06-11 PROBLEM — D18.01 HEMANGIOMA OF SKIN AND SUBCUTANEOUS TISSUE: Status: ACTIVE | Noted: 2025-06-11

## 2025-06-11 PROCEDURE — ? PRESCRIPTION MEDICATION MANAGEMENT

## 2025-06-11 PROCEDURE — ? PHOTO-DOCUMENTATION

## 2025-06-11 PROCEDURE — ? COUNSELING

## 2025-06-11 PROCEDURE — ? FULL BODY SKIN EXAM

## 2025-06-11 PROCEDURE — ? TREATMENT REGIMEN

## 2025-06-11 PROCEDURE — ? PRESCRIPTION

## 2025-06-11 PROCEDURE — ? OBSERVATION

## 2025-06-11 RX ORDER — CLOBETASOL PROPIONATE 0.5 MG/G
CREAM TOPICAL BID
Qty: 60 | Refills: 3 | Status: ERX | COMMUNITY
Start: 2025-06-11

## 2025-06-11 RX ADMIN — CLOBETASOL PROPIONATE: 0.5 CREAM TOPICAL at 00:00

## 2025-06-11 ASSESSMENT — LOCATION SIMPLE DESCRIPTION DERM
LOCATION SIMPLE: RIGHT FOREHEAD
LOCATION SIMPLE: CHEST
LOCATION SIMPLE: LEFT ELBOW
LOCATION SIMPLE: LEFT CHEEK
LOCATION SIMPLE: ABDOMEN
LOCATION SIMPLE: RIGHT UPPER BACK
LOCATION SIMPLE: RIGHT SHOULDER
LOCATION SIMPLE: UPPER BACK
LOCATION SIMPLE: NOSE

## 2025-06-11 ASSESSMENT — LOCATION ZONE DERM
LOCATION ZONE: TRUNK
LOCATION ZONE: NOSE
LOCATION ZONE: ARM
LOCATION ZONE: FACE

## 2025-06-11 ASSESSMENT — LOCATION DETAILED DESCRIPTION DERM
LOCATION DETAILED: UPPER STERNUM
LOCATION DETAILED: EPIGASTRIC SKIN
LOCATION DETAILED: RIGHT SUPERIOR MEDIAL UPPER BACK
LOCATION DETAILED: LEFT CENTRAL MALAR CHEEK
LOCATION DETAILED: RIGHT ANTERIOR SHOULDER
LOCATION DETAILED: NASAL TIP
LOCATION DETAILED: INFERIOR THORACIC SPINE
LOCATION DETAILED: RIGHT MEDIAL FOREHEAD
LOCATION DETAILED: RIGHT INFERIOR MEDIAL UPPER BACK
LOCATION DETAILED: LEFT ELBOW

## 2025-06-11 NOTE — PROCEDURE: PRESCRIPTION MEDICATION MANAGEMENT
Render In Strict Bullet Format?: No
Initiate Treatment: clobetasol 0.05 % topical cream: Apply twice daily to AA on arm x 4 weeks, then take 2 weeks off. Repeat cycle until resolved
Detail Level: Zone

## 2025-06-11 NOTE — HPI: EVALUATION OF SKIN LESION(S)
Hpi Title: Evaluation of Skin Lesions
Additional History: Patient has concerns regarding bumps on the left arm